# Patient Record
Sex: FEMALE | Race: BLACK OR AFRICAN AMERICAN | NOT HISPANIC OR LATINO | Employment: FULL TIME | URBAN - METROPOLITAN AREA
[De-identification: names, ages, dates, MRNs, and addresses within clinical notes are randomized per-mention and may not be internally consistent; named-entity substitution may affect disease eponyms.]

---

## 2018-02-15 ENCOUNTER — HOSPITAL ENCOUNTER (EMERGENCY)
Facility: MEDICAL CENTER | Age: 58
End: 2018-02-15
Attending: EMERGENCY MEDICINE
Payer: COMMERCIAL

## 2018-02-15 ENCOUNTER — APPOINTMENT (OUTPATIENT)
Dept: RADIOLOGY | Facility: MEDICAL CENTER | Age: 58
End: 2018-02-15
Attending: EMERGENCY MEDICINE
Payer: COMMERCIAL

## 2018-02-15 VITALS
RESPIRATION RATE: 16 BRPM | BODY MASS INDEX: 40.16 KG/M2 | HEART RATE: 62 BPM | HEIGHT: 69 IN | WEIGHT: 271.17 LBS | TEMPERATURE: 97 F | SYSTOLIC BLOOD PRESSURE: 160 MMHG | OXYGEN SATURATION: 98 % | DIASTOLIC BLOOD PRESSURE: 93 MMHG

## 2018-02-15 DIAGNOSIS — S40.012A CONTUSION OF LEFT SHOULDER, INITIAL ENCOUNTER: ICD-10-CM

## 2018-02-15 PROCEDURE — 99284 EMERGENCY DEPT VISIT MOD MDM: CPT

## 2018-02-15 PROCEDURE — 73030 X-RAY EXAM OF SHOULDER: CPT | Mod: LT

## 2018-02-15 RX ORDER — METHYLPREDNISOLONE 4 MG/1
TABLET ORAL
Qty: 21 TAB | Refills: 0 | Status: SHIPPED | OUTPATIENT
Start: 2018-02-15 | End: 2022-01-09

## 2018-02-15 ASSESSMENT — PAIN SCALES - GENERAL: PAINLEVEL_OUTOF10: 4

## 2018-02-15 NOTE — LETTER
"  FORM C-4:  EMPLOYEE’S CLAIM FOR COMPENSATION/ REPORT OF INITIAL TREATMENT  EMPLOYEE’S CLAIM - PROVIDE ALL INFORMATION REQUESTED   First Name  Olga Last Name  Adenike Birthdate             Age  1960 57 y.o. Sex  female Claim Number   Home Employee Address  [address]   [facility]                                     Zip  [address] Height  1.753 m (5' 9\") Weight  123 kg (271 lb 2.7 oz) N  [id number]   Mailing Employee Address                           [address]    [facility]               Zip  [address] Telephone  [phone] (home)  Primary Language Spoken  ENGLISH   Insurer  [employer]   [employer] Employee's Occupation (Job Title) When Injury or Occupational Disease Occurred     Employer's Name  [employer]   Telephone  [phone]    Employer Address  [address]] Zip  [address]   Date of Injury  2/2/2018       Hour of Injury  1:00 AM Date Employer Notified  2/2/2018 Last Day of Work after Injury or Occupational Disease  2/15/2018 Supervisor to Whom Injury Reported  Edwin Churchill   Address or Location of Accident (if applicable)  [[place]]   What were you doing at the time of accident? (if applicable)  Entering elevator on electric pallet jacka and elevator came down on me    How did this injury or occupational disease occur? Be specific and answer in detail. Use additional sheet if necessary)  I was hit on the head and shoulder by elevator door while entering elevator   If you believe that you have an occupational disease, when did you first have knowledge of the disability and it relationship to your employment?  N/A Witnesses to the Accident  Julian Minor     Nature of Injury or Occupational Disease  Contusion  Part(s) of Body Injured or Affected  Skull, Shoulder (L), N/A    I certify that the above is true and correct to the best of my knowledge and that I have provided this " information in order to obtain the benefits of [place]  I hereby authorize any physician, chiropractor, surgeon, practitioner, or other person, any hospital, including [facility] or [facility], any medical service organization, any insurance company, or other institution or organization to release to each other, any medical or other information, including benefits paid or payable, pertinent to this injury or disease, except information relative to diagnosis, treatment and/or counseling for AIDS, psychological conditions, alcohol or controlled substances, for which I must give specific authorization.  A Photostat of this authorization shall be as valid as the original.   Date  02/15/2018 [facility] Employee’s Signature   THIS REPORT MUST BE COMPLETED AND MAILED WITHIN 3 WORKING DAYS OF TREATMENT   Place  [facility], EMERGENCY DEPT  Name of Facility   [facility]   Date  2/15/2018 Diagnosis  (S40.012A) Contusion of left shoulder, initial encounter Is there evidence the injured employee was under the influence of alcohol and/or another controlled substance at the time of accident?   Hour  10:28 AM Description of Injury or Disease  Contusion of left shoulder, initial encounter No   Treatment  Exam, x-ray, medication  Have you advised the patient to remain off work five days or more?         No   X-Ray Findings  Negative   If Yes   From Date    To Date      From information given by the employee, together with medical evidence, can you directly connect this injury or occupational disease as job incurred?  Yes If No, is the employee capable of: Full Duty  No Modified Duty  Yes   Is additional medical care by a physician indicated?  Yes If Modified Duty, Specify any Limitations / Restrictions  No using left arm     Do you know of  "any previous injury or disease contributing to this condition or occupational disease?  No   Date  2/15/2018 Print Doctor’s Name  Corona Moeller YASMIN certify the employer’s copy of this form was mailed on:   Address  [address] [address] [phone] Insurer’s Use Only   [facility] [facility]  [address]    Provider’s Tax ID Number  [id number] Telephone  Dept: [phone]    Doctor’s Signature  e-TIFFANIE Cote M.D. Degree   MD    Original - TREATING PHYSICIAN OR CHIROPRACTOR   Pg 2-Insurer/TPA   Pg 3-Employer   Pg 4-Employee                                                                                                  Form C-4 (rev01/03)     BRIEF DESCRIPTION OF RIGHTS AND BENEFITS  (Pursuant to [place] 616C.050)    Notice of Injury or Occupational Disease (Incident Report Form C-1): If an injury or occupational disease (OD) arises out of and in the course of employment, you must provide written notice to your employer as soon as practicable, but no later than 7 days after the accident or OD. Your employer shall maintain a sufficient supply of the required forms.    Claim for Compensation (Form C-4): If medical treatment is sought, the form C-4 is available at the place of initial treatment. A completed \"Claim for Compensation\" (Form C-4) must be filed within 90 days after an accident or OD. The treating physician or chiropractor must, within 3 working days after treatment, complete and mail to the employer, the employer's insurer and third-party , the Claim for Compensation.    Medical Treatment: If you require medical treatment for your on-the-job injury or OD, you may be required to select a physician or chiropractor from a list provided by your workers’ compensation insurer, if it has contracted with an Organization for Managed Care (MCO) or Preferred Provider Organization (PPO) or providers of health care. If your employer has not entered into a contract with an " MCO or PPO, you may select a physician or chiropractor from the Panel of Physicians and Chiropractors. Any medical costs related to your industrial injury or OD will be paid by your insurer.    Temporary Total Disability (TTD): If your doctor has certified that you are unable to work for a period of at least 5 consecutive days, or 5 cumulative days in a 20-day period, or places restrictions on you that your employer does not accommodate, you may be entitled to TTD compensation.    Temporary Partial Disability (TPD): If the wage you receive upon reemployment is less than the compensation for TTD to which you are entitled, the insurer may be required to pay you TPD compensation to make up the difference. TPD can only be paid for a maximum of 24 months.    Permanent Partial Disability (PPD): When your medical condition is stable and there is an indication of a PPD as a result of your injury or OD, within 30 days, your insurer must arrange for an evaluation by a rating physician or chiropractor to determine the degree of your PPD. The amount of your PPD award depends on the date of injury, the results of the PPD evaluation and your age and wage.    Permanent Total Disability (PTD): If you are medically certified by a treating physician or chiropractor as permanently and totally disabled and have been granted a PTD status by your insurer, you are entitled to receive monthly benefits not to exceed 66 2/3% of your average monthly wage. The amount of your PTD payments is subject to reduction if you previously received a PPD award.    Vocational Rehabilitation Services: You may be eligible for vocational rehabilitation services if you are unable to return to the job due to a permanent physical impairment or permanent restrictions as a result of your injury or occupational disease.    Transportation and Per Melany Reimbursement: You may be eligible for travel expenses and per melany associated with medical treatment.  Reopening:  You may be able to reopen your claim if your condition worsens after claim closure.    Appeal Process: If you disagree with a written determination issued by the insurer or the insurer does not respond to your request, you may appeal to the Department of Administration, , by following the instructions contained in your determination letter. You must appeal the determination within 70 days from the date of the determination letter at [address], [address], [place], [place] [address], or [address], [address], [place], [place] [address]. If you disagree with the  decision, you may appeal to the Department of Administration, . You must file your appeal within 30 days from the date of the  decision letter at [address], [address], [place], [place] [address], or [address], [place], [place] [address]. If you disagree with a decision of an , you may file a petition for judicial review with the District Court. You must do so within 30 days of the Appeal Officer’s decision. You may be represented by an  at your own expense or you may contact the [facility] for possible representation.    [place]  for Injured Workers (NAIW): If you disagree with a  decision, you may request that NAIW represent you without charge at an  Hearing. For information regarding denial of benefits, you may contact the [facility] at: [address], [address], [place] [address], [phone], or [address], [address], [place] [address], [phone]    To File a Complaint with the Division: If you wish to file a complaint with the  of the Division of Industrial Relations (DIR), please contact the Workers’ Compensation Section, [address], [address], [place], [place] [address], telephone (520) [phone], or [address], [address],  [place], [place] [address], telephone [phone].    For assistance with Workers’ Compensation Issues: you may contact the Office of the Governor Consumer Health Assistance, [address] [place] [address], [address], [place], [place] [address], Toll Free 1-387.213.5890, Web site: [email or web address] access [email or web address]., E-mail [email or web address].                                                                                                                                                                               __________________________________________________________________                                    _________________            Employee Name / Signature                                                                                                                            Date                                       D-2 (rev. 10/07)

## 2018-02-15 NOTE — ED PROVIDER NOTES
ED Provider Note    Scribed for Ronnell Go M.D. by Simon Henderson. 2/15/2018  8:39 AM    Primary care provider: Abel Burnette M.D.  Means of arrival: walk in  History obtained from: patient  History limited by: none    CHIEF COMPLAINT  Chief Complaint   Patient presents with   • Head Injury   • Shoulder Injury       HPI  Olga Jeong is a 57 y.o. female who presents to the Emergency Department complaining of intermittent headache and left shoulder pain status post blunt injury sustained 13 days ago. Patient reports that she was at work on February 2nd on a pallet , when she was impacted from above on the top of her head and left shoulder by a freight elevator door. She states that her pain is intermittent but has persisted for 13 days, prompting her to come to the ED today. Patient denies loss of consciousness, dizziness.     REVIEW OF SYSTEMS  Pertinent positives include headache, shoulder pain.   Pertinent negatives include no loss of consciousness, dizziness.    All other systems reviewed and negative.    E.    PAST MEDICAL HISTORY   has a past medical history of HTN (hypertension) and Indigestion.    SURGICAL HISTORY   has a past surgical history that includes cholecystectomy and ercp in or (N/A, 10/10/2015).    SOCIAL HISTORY  Social History   Substance Use Topics   • Smoking status: Never Smoker   • Smokeless tobacco: None noted   • Alcohol use No      History   Drug Use No       FAMILY HISTORY  Family History   Problem Relation Age of Onset   • Stroke Mother        CURRENT MEDICATIONS  Home Medications     Reviewed by Suzanne Victoria R.N. (Registered Nurse) on 02/15/18 at 0721  Med List Status: Partial   Medication Last Dose Status   AMOXICILLIN PO  Active   aspirin (ASA) 81 MG Chew Tab chewable tablet 2/14/2018 Active   hydrochlorothiazide (HYDRODIURIL) 25 MG TABS 2/14/2018 Active   hydrocodone-acetaminophen (NORCO) 5-325 MG Tab per tablet 2/14/2018 Active   lisinopril (PRINIVIL)  "20 MG TABS 2/14/2018 Active   pravastatin (PRAVACHOL) 20 MG TABS 2/14/2018 Active                ALLERGIES  No Known Allergies    PHYSICAL EXAM  VITAL SIGNS: /87   Pulse 69   Temp 36.1 °C (97 °F) (Temporal)   Resp 16   Ht 1.753 m (5' 9\")   Wt 123 kg (271 lb 2.7 oz)   SpO2 97%   BMI 40.04 kg/m²     Nursing note and vitals reviewed.  Constitutional: Well-developed and well-nourished. No distress.   HENT: Head is normocephalic and atraumatic. Oropharynx is clear and moist without exudate or erythema.   Eyes: Pupils are equal, round, and reactive to light. Conjunctiva are normal.   Musculoskeletal: Extremities exhibit normal range of motion without edema. Mild tenderness over left dicromian. No tenderness of the clavicle and no clavicle deformity.   Neurological: Awake, alert and oriented to person, place, and time. No focal deficits noted.  Skin: Skin is warm and dry. No rash.   Psychiatric: Normal mood and affect. Appropriate for clinical situation    DIAGNOSTIC STUDIES / PROCEDURES    RADIOLOGY  DX-SHOULDER 2+ LEFT   Final Result      No evidence of acute fracture or dislocation.      The radiologist's interpretation of all radiological studies have been reviewed by me.    COURSE & MEDICAL DECISION MAKING  Nursing notes, VS, PMSFHx reviewed in chart.     8:39 AM - Patient seen and examined at bedside. At this time I do not suspect fracture. Patient's greatest pain ins in her shoulder, but she is able to range her arm fully with some tenderness. She will be evaluted to rule out occult fractures. I discussed treatment with antiinflammatory and follow up instructions if her reults were negative. Patient verbalizes understanding and agrees to this course of action. Ordered DX shoulder to evaluate her symptoms. The differential diagnoses include but are not limited to: bursitis, shoulder fracture    10:13 AM - Recheck: Patient re-evaluated at beside. Patient's radiology results discussed. Discussed patient's " condition and treatment plan. Patient will be discharged with instructions and provided with strict return precautions. Patient will be sent home with a prescription for Medrol 4 mg. Advised to follow up with occupational health. Instructed to return to Emergency Department immediately if any new or worsening symptoms.    Patient presents today with left shoulder pain. She has a prior injury. She had some mild head trauma and no loss consciousness. She does not meet criteria for head CT. X-ray of the left shoulder is unrevealing. The patient will follow-up with Workmen's Compensation and understands that she may need an MRI in the future to evaluate for soft tissue injury.    The patient will return for new or worsening symptoms and is stable at the time of discharge.    The patient is referred to a primary physician for blood pressure management, diabetic screening, and for all other preventative health concerns.    DISPOSITION:  Patient will be discharged home in stable condition.    FOLLOW UP:  [facility], Emergency Dept  [address] [id number] [phone]    If symptoms worsen    [facility]  [address] [place] [address]  [phone]            OUTPATIENT MEDICATIONS:  New Prescriptions    METHYLPREDNISOLONE (MEDROL) 4 MG TAB    Take as per the package instructions.         FINAL IMPRESSION  1. Contusion of left shoulder, initial encounter          Simon HOFFMAN (Monroeibe), am scribing for, and in the presence of, Ronnell Go M.D..    Electronically signed by: Simon Henderson (Dil[place]), 2/15/2018    Ronnell HOFFMAN M.D. personally performed the services described in this documentation, as scribed by Simon Henderson in my presence, and it is both accurate and complete.    The note accurately reflects work and decisions made by me.  Ronnell Go  2/15/2018  2:50 PM

## 2018-02-15 NOTE — ED NOTES
Pt ambulated to yellow 63,provided with gown to wear.  Per pt pain comes and goes but not worst today.

## 2018-02-15 NOTE — ED TRIAGE NOTES
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

## 2018-02-15 NOTE — DISCHARGE INSTRUCTIONS
Shoulder Pain  The shoulder is the joint that connects your arms to your body. The bones that form the shoulder joint include the upper arm bone (humerus), the shoulder blade (scapula), and the collarbone (clavicle). The top of the humerus is shaped like a ball and fits into a rather flat socket on the scapula (glenoid cavity). A combination of muscles and strong, fibrous tissues that connect muscles to bones (tendons) support your shoulder joint and hold the ball in the socket. Small, fluid-filled sacs (bursae) are located in different areas of the joint. They act as cushions between the bones and the overlying soft tissues and help reduce friction between the gliding tendons and the bone as you move your arm. Your shoulder joint allows a wide range of motion in your arm. This range of motion allows you to do things like scratch your back or throw a ball. However, this range of motion also makes your shoulder more prone to pain from overuse and injury.  Causes of shoulder pain can originate from both injury and overuse and usually can be grouped in the following four categories:  · Redness, swelling, and pain (inflammation) of the tendon (tendinitis) or the bursae (bursitis).  · Instability, such as a dislocation of the joint.  · Inflammation of the joint (arthritis).  · Broken bone (fracture).  HOME CARE INSTRUCTIONS   · Apply ice to the sore area.  ¨ Put ice in a plastic bag.  ¨ Place a towel between your skin and the bag.  ¨ Leave the ice on for 15-20 minutes, 3-4 times per day for the first 2 days, or as directed by your health care provider.  · Stop using cold packs if they do not help with the pain.  · If you have a shoulder sling or immobilizer, wear it as long as your caregiver instructs. Only remove it to shower or bathe. Move your arm as little as possible, but keep your hand moving to prevent swelling.  · Squeeze a soft ball or foam pad as much as possible to help prevent swelling.  · Only take  over-the-counter or prescription medicines for pain, discomfort, or fever as directed by your caregiver.  SEEK MEDICAL CARE IF:   · Your shoulder pain increases, or new pain develops in your arm, hand, or fingers.  · Your hand or fingers become cold and numb.  · Your pain is not relieved with medicines.  SEEK IMMEDIATE MEDICAL CARE IF:   · Your arm, hand, or fingers are numb or tingling.  · Your arm, hand, or fingers are significantly swollen or turn white or blue.  MAKE SURE YOU:   · Understand these instructions.  · Will watch your condition.  · Will get help right away if you are not doing well or get worse.     This information is not intended to replace advice given to you by your health care provider. Make sure you discuss any questions you have with your health care provider.     Document Released: 09/27/2006 Document Revised: 01/08/2016 Document Reviewed: 04/11/2016  [employer] Interactive Patient Education ©2016 [employer]

## 2019-06-18 ENCOUNTER — HOSPITAL ENCOUNTER (OUTPATIENT)
Dept: LAB | Facility: MEDICAL CENTER | Age: 59
End: 2019-06-18
Attending: SURGERY
Payer: COMMERCIAL

## 2019-06-18 PROCEDURE — 88304 TISSUE EXAM BY PATHOLOGIST: CPT

## 2019-06-19 LAB — PATHOLOGY CONSULT NOTE: NORMAL

## 2020-04-22 ENCOUNTER — HOSPITAL ENCOUNTER (OUTPATIENT)
Dept: CARDIOLOGY | Facility: MEDICAL CENTER | Age: 60
End: 2020-04-22
Attending: NURSE PRACTITIONER
Payer: COMMERCIAL

## 2020-04-22 DIAGNOSIS — R60.9 EDEMA, UNSPECIFIED TYPE: ICD-10-CM

## 2020-04-22 LAB
LV EJECT FRACT  99904: 65
LV EJECT FRACT MOD 2C 99903: 65.37
LV EJECT FRACT MOD 4C 99902: 68.08
LV EJECT FRACT MOD BP 99901: 66.18

## 2020-04-22 PROCEDURE — 93306 TTE W/DOPPLER COMPLETE: CPT

## 2020-04-22 PROCEDURE — 93306 TTE W/DOPPLER COMPLETE: CPT | Mod: 26 | Performed by: INTERNAL MEDICINE

## 2021-03-15 DIAGNOSIS — Z23 NEED FOR VACCINATION: ICD-10-CM

## 2021-05-08 ENCOUNTER — APPOINTMENT (OUTPATIENT)
Dept: RADIOLOGY | Facility: MEDICAL CENTER | Age: 61
End: 2021-05-08
Attending: EMERGENCY MEDICINE
Payer: COMMERCIAL

## 2021-05-08 ENCOUNTER — HOSPITAL ENCOUNTER (EMERGENCY)
Facility: MEDICAL CENTER | Age: 61
End: 2021-05-08
Attending: EMERGENCY MEDICINE
Payer: COMMERCIAL

## 2021-05-08 VITALS
HEIGHT: 69 IN | RESPIRATION RATE: 15 BRPM | SYSTOLIC BLOOD PRESSURE: 144 MMHG | DIASTOLIC BLOOD PRESSURE: 76 MMHG | TEMPERATURE: 98.2 F | WEIGHT: 277.78 LBS | OXYGEN SATURATION: 96 % | HEART RATE: 52 BPM | BODY MASS INDEX: 41.14 KG/M2

## 2021-05-08 DIAGNOSIS — I10 ESSENTIAL HYPERTENSION: ICD-10-CM

## 2021-05-08 DIAGNOSIS — R11.0 NAUSEA: ICD-10-CM

## 2021-05-08 DIAGNOSIS — R42 VERTIGO: ICD-10-CM

## 2021-05-08 LAB
ALBUMIN SERPL BCP-MCNC: 3.9 G/DL (ref 3.2–4.9)
ALBUMIN/GLOB SERPL: 1.3 G/DL
ALP SERPL-CCNC: 87 U/L (ref 30–99)
ALT SERPL-CCNC: 16 U/L (ref 2–50)
ANION GAP SERPL CALC-SCNC: 9 MMOL/L (ref 7–16)
AST SERPL-CCNC: 27 U/L (ref 12–45)
BASOPHILS # BLD AUTO: 1.3 % (ref 0–1.8)
BASOPHILS # BLD: 0.04 K/UL (ref 0–0.12)
BILIRUB SERPL-MCNC: 0.4 MG/DL (ref 0.1–1.5)
BUN SERPL-MCNC: 14 MG/DL (ref 8–22)
CALCIUM SERPL-MCNC: 9.2 MG/DL (ref 8.5–10.5)
CHLORIDE SERPL-SCNC: 108 MMOL/L (ref 96–112)
CO2 SERPL-SCNC: 22 MMOL/L (ref 20–33)
CREAT SERPL-MCNC: 0.76 MG/DL (ref 0.5–1.4)
EKG IMPRESSION: NORMAL
EOSINOPHIL # BLD AUTO: 0.08 K/UL (ref 0–0.51)
EOSINOPHIL NFR BLD: 2.6 % (ref 0–6.9)
ERYTHROCYTE [DISTWIDTH] IN BLOOD BY AUTOMATED COUNT: 43.8 FL (ref 35.9–50)
GLOBULIN SER CALC-MCNC: 3.1 G/DL (ref 1.9–3.5)
GLUCOSE SERPL-MCNC: 128 MG/DL (ref 65–99)
HCT VFR BLD AUTO: 41.5 % (ref 37–47)
HGB BLD-MCNC: 13.6 G/DL (ref 12–16)
IMM GRANULOCYTES # BLD AUTO: 0.01 K/UL (ref 0–0.11)
IMM GRANULOCYTES NFR BLD AUTO: 0.3 % (ref 0–0.9)
LYMPHOCYTES # BLD AUTO: 1.42 K/UL (ref 1–4.8)
LYMPHOCYTES NFR BLD: 45.8 % (ref 22–41)
MCH RBC QN AUTO: 29.7 PG (ref 27–33)
MCHC RBC AUTO-ENTMCNC: 32.8 G/DL (ref 33.6–35)
MCV RBC AUTO: 90.6 FL (ref 81.4–97.8)
MONOCYTES # BLD AUTO: 0.16 K/UL (ref 0–0.85)
MONOCYTES NFR BLD AUTO: 5.2 % (ref 0–13.4)
NEUTROPHILS # BLD AUTO: 1.39 K/UL (ref 2–7.15)
NEUTROPHILS NFR BLD: 44.8 % (ref 44–72)
NRBC # BLD AUTO: 0 K/UL
NRBC BLD-RTO: 0 /100 WBC
PLATELET # BLD AUTO: 305 K/UL (ref 164–446)
PMV BLD AUTO: 10.7 FL (ref 9–12.9)
POTASSIUM SERPL-SCNC: 4.1 MMOL/L (ref 3.6–5.5)
PROT SERPL-MCNC: 7 G/DL (ref 6–8.2)
RBC # BLD AUTO: 4.58 M/UL (ref 4.2–5.4)
SODIUM SERPL-SCNC: 139 MMOL/L (ref 135–145)
TROPONIN T SERPL-MCNC: 6 NG/L (ref 6–19)
WBC # BLD AUTO: 3.1 K/UL (ref 4.8–10.8)

## 2021-05-08 PROCEDURE — 93005 ELECTROCARDIOGRAM TRACING: CPT

## 2021-05-08 PROCEDURE — 700111 HCHG RX REV CODE 636 W/ 250 OVERRIDE (IP): Performed by: EMERGENCY MEDICINE

## 2021-05-08 PROCEDURE — 84484 ASSAY OF TROPONIN QUANT: CPT

## 2021-05-08 PROCEDURE — 99284 EMERGENCY DEPT VISIT MOD MDM: CPT

## 2021-05-08 PROCEDURE — 80053 COMPREHEN METABOLIC PANEL: CPT

## 2021-05-08 PROCEDURE — 96374 THER/PROPH/DIAG INJ IV PUSH: CPT

## 2021-05-08 PROCEDURE — A9270 NON-COVERED ITEM OR SERVICE: HCPCS | Performed by: EMERGENCY MEDICINE

## 2021-05-08 PROCEDURE — 70450 CT HEAD/BRAIN W/O DYE: CPT

## 2021-05-08 PROCEDURE — 700102 HCHG RX REV CODE 250 W/ 637 OVERRIDE(OP): Performed by: EMERGENCY MEDICINE

## 2021-05-08 PROCEDURE — 93005 ELECTROCARDIOGRAM TRACING: CPT | Performed by: EMERGENCY MEDICINE

## 2021-05-08 PROCEDURE — 85025 COMPLETE CBC W/AUTO DIFF WBC: CPT

## 2021-05-08 RX ORDER — MECLIZINE HYDROCHLORIDE 25 MG/1
25 TABLET ORAL ONCE
Status: COMPLETED | OUTPATIENT
Start: 2021-05-08 | End: 2021-05-08

## 2021-05-08 RX ORDER — ONDANSETRON 4 MG/1
4 TABLET, ORALLY DISINTEGRATING ORAL EVERY 8 HOURS PRN
Qty: 15 TABLET | Refills: 1 | Status: SHIPPED | OUTPATIENT
Start: 2021-05-08 | End: 2022-01-13

## 2021-05-08 RX ORDER — ONDANSETRON 2 MG/ML
4 INJECTION INTRAMUSCULAR; INTRAVENOUS ONCE
Status: COMPLETED | OUTPATIENT
Start: 2021-05-08 | End: 2021-05-08

## 2021-05-08 RX ORDER — ATORVASTATIN CALCIUM 20 MG/1
80 TABLET, FILM COATED ORAL NIGHTLY
Status: SHIPPED | COMMUNITY
End: 2022-01-09

## 2021-05-08 RX ORDER — MECLIZINE HYDROCHLORIDE 25 MG/1
25 TABLET ORAL 3 TIMES DAILY PRN
Qty: 30 TABLET | Refills: 0 | Status: SHIPPED | OUTPATIENT
Start: 2021-05-08 | End: 2022-01-09

## 2021-05-08 RX ORDER — AMLODIPINE BESYLATE AND BENAZEPRIL HYDROCHLORIDE 5; 20 MG/1; MG/1
1 CAPSULE ORAL DAILY
Status: SHIPPED | COMMUNITY
End: 2023-07-18

## 2021-05-08 RX ADMIN — MECLIZINE HYDROCHLORIDE 25 MG: 25 TABLET ORAL at 16:42

## 2021-05-08 RX ADMIN — ONDANSETRON 4 MG: 2 INJECTION INTRAMUSCULAR; INTRAVENOUS at 16:42

## 2021-05-08 NOTE — ED TRIAGE NOTES
.  Chief Complaint   Patient presents with   • Dizziness     nauseated      Pt ambulate to triage with c/o with waking up feeling dizzy and notes the room was spinning. Pt notes nausea, denies vomiting or HA. Pt reports taking all BP medications as directed but BP is higher than normal.    Pt educated on triage process and returned to lobby.

## 2021-05-08 NOTE — Clinical Note
Olga Jeong was seen and treated in our emergency department on 5/8/2021.  She may return to work on 05/12/2021.       If you have any questions or concerns, please don't hesitate to call.      Kevin Monroe D.O.

## 2021-05-08 NOTE — ED NOTES
"Wheeled to room by this RN. Changed into a hospital gown. Call light within reach. Instructed to call staff for any assistance.  Aaox4. C/o dizziness \"room spinning\", happens when her Blood Pressure gets elevated. Has hx of HTN. Pt took blood pressure medication this am.  States sensation in left arm is sharper in right arm.  equal. No drift. No droop/speech is clear. Also reports blurred vision.  "

## 2021-05-09 NOTE — ED PROVIDER NOTES
ED Provider Note    CHIEF COMPLAINT  Chief Complaint   Patient presents with   • Dizziness     nauseated       HPI  Olgajason Jeong is a 60 y.o. female who presents to the emergency room today with complaints of dizziness.  She describes the dizziness as room spinning started this morning and she noticed her blood pressure was elevated she states he does have a history of this in the past.  The dizziness is worse with position changes of her hand or standing.  No loss of speech no loss of movement arms or legs but she does have some generalized weakness and nausea no vomiting.  She has had history of TIAs in the past but this does not feel similar.  Denies chest pain or shortness of breath.  NIH score of 0 .    REVIEW OF SYSTEMS  See HPI for further details. All other systems are negative.     PAST MEDICAL HISTORY  Past Medical History:   Diagnosis Date   • HTN (hypertension)    • Indigestion        FAMILY HISTORY  [unfilled]    SOCIAL HISTORY  Social History     Socioeconomic History   • Marital status:      Spouse name: Not on file   • Number of children: Not on file   • Years of education: Not on file   • Highest education level: Not on file   Occupational History   • Not on file   Tobacco Use   • Smoking status: Never Smoker   Substance and Sexual Activity   • Alcohol use: No   • Drug use: No   • Sexual activity: Not on file   Other Topics Concern   • Not on file   Social History Narrative   • Not on file     Social Determinants of Health     Financial Resource Strain:    • Difficulty of Paying Living Expenses:    Food Insecurity:    • Worried About Running Out of Food in the Last Year:    • Ran Out of Food in the Last Year:    Transportation Needs:    • Lack of Transportation (Medical):    • Lack of Transportation (Non-Medical):    Physical Activity:    • Days of Exercise per Week:    • Minutes of Exercise per Session:    Stress:    • Feeling of Stress :    Social Connections:    • Frequency of  "Communication with Friends and Family:    • Frequency of Social Gatherings with Friends and Family:    • Attends Voodoo Services:    • Active Member of Clubs or Organizations:    • Attends Club or Organization Meetings:    • Marital Status:    Intimate Partner Violence:    • Fear of Current or Ex-Partner:    • Emotionally Abused:    • Physically Abused:    • Sexually Abused:        SURGICAL HISTORY  Past Surgical History:   Procedure Laterality Date   • ERCP IN OR N/A 10/10/2015    Procedure: ERCP IN OR;  Surgeon: Jefry Bennett M.D.;  Location: SURGERY [facility];  Service:    • CHOLECYSTECTOMY         CURRENT MEDICATIONS  Home Medications     Reviewed by Jessica Michelle R.N. (Registered Nurse) on 05/08/21 at 1551  Med List Status: Complete   Medication Last Dose Status   amlodipine-benazepril (LOTREL) 5-20 MG per capsule 5/8/2021 Active   AMOXICILLIN PO  Active   aspirin (ASA) 81 MG Chew Tab chewable tablet 5/8/2021 Active   atorvastatin (LIPITOR) 20 MG Tab 5/8/2021 Active   hydrochlorothiazide (HYDRODIURIL) 25 MG TABS 5/8/2021 Active   hydrocodone-acetaminophen (NORCO) 5-325 MG Tab per tablet not taking Active   lisinopril (PRINIVIL) 20 MG TABS  Active   methylPREDNISolone (MEDROL) 4 MG Tab  Active   pravastatin (PRAVACHOL) 20 MG TABS  Active                ALLERGIES  No Known Allergies    PHYSICAL EXAM  VITAL SIGNS: /76   Pulse (!) 56   Temp 36.8 °C (98.2 °F) (Temporal)   Resp 14   Ht 1.753 m (5' 9\")   Wt (!) 126 kg (277 lb 12.5 oz)   SpO2 92%   BMI 41.02 kg/m²       Constitutional: Well developed, Well nourished,  acute distress, Non-toxic appearance.   HENT: Normocephalic, Atraumatic, Bilateral external ears normal, Oropharynx moist, No oral exudates, Nose normal.   Eyes: PERRLA, EOMI, Conjunctiva normal, No discharge.  Horizontal nystagmus consistent with benign positional vertigo and dizziness is worse with position changes  Neck: Normal range of motion, No tenderness, Supple, No " stridor.   Lymphatic: No lymphadenopathy noted.   Cardiovascular: Normal heart rate, Normal rhythm, No murmurs, No rubs, No gallops.   Thorax & Lungs: Normal breath sounds, No respiratory distress, No wheezing, No chest tenderness.   Abdomen: Bowel sounds normal, Soft, No tenderness, No masses, No pulsatile masses.   Skin: Warm, Dry, No erythema, No rash.   Back: No tenderness, No CVA tenderness.     Extremities: Intact distal pulses, No edema, No tenderness, No cyanosis, No clubbing.   Musculoskeletal: Good range of motion in all major joints. No tenderness to palpation or major deformities noted.   Neurologic: Alert & oriented x 3, Normal motor function, Normal sensory function, No focal deficits noted.  No lateralizing signs  Psychiatric: Affect normal, Judgment normal, Mood normal.     EKG  Normal sinus rhythm 60 bpm, no ST elevation or depression, no widening of the QRS complex, good R wave progression, UT intervals are normal.  The twelve-lead EKG there is no previous EKG developed this time for comparison.    RADIOLOGY/PROCEDURES  CT-HEAD W/O   Final Result      No acute intracranial abnormality.            COURSE & MEDICAL DECISION MAKING  Pertinent Labs & Imaging studies reviewed. (See chart for details)  Patient was given meclizine here in the emergency room along with antinausea medication Zofran her symptoms have resolved.  On reexamination she feels much improved.  She is continued on his medications for home.  I do feel that this is benign positional vertigo as her symptoms changed with position changes of her head as well as horizontal nystagmus she also has apparently similar symptoms in the past with her elevated blood pressure blood pressure was elevated at 188/110 but has improved and now normal.  Patient will follow up with primary care physician return if persistent or worsening symptoms note for work was provided.  Discharged in stable, asymptomatic and improved condition as above to  home.    FINAL IMPRESSION  1.  Acute benign positional vertigo  2.  Hypertension  3.  Nausea         Electronically signed by: Kevin Monroe D.O., 5/8/2021 7:16 PM

## 2021-05-09 NOTE — ED NOTES
Discharge instructions given to pt including follow up with pcp or returning if no improvement of symptoms or to return if worse. Prescription x 2 provided to pt. Questions answered by RN. Denies any new complaints. Discharged w/stable vitals and able to ambulate to the lobby by w/steady gait. Neuro intact.

## 2021-05-09 NOTE — ED NOTES
Omkar Kennedy called and updated pt with the plan of care. Obtained consent via telephone to do Lumbar Puncture on pt.

## 2022-01-09 ENCOUNTER — OFFICE VISIT (OUTPATIENT)
Dept: URGENT CARE | Facility: PHYSICIAN GROUP | Age: 62
End: 2022-01-09
Payer: COMMERCIAL

## 2022-01-09 ENCOUNTER — HOSPITAL ENCOUNTER (OUTPATIENT)
Dept: RADIOLOGY | Facility: MEDICAL CENTER | Age: 62
End: 2022-01-09
Attending: EMERGENCY MEDICINE
Payer: COMMERCIAL

## 2022-01-09 VITALS
DIASTOLIC BLOOD PRESSURE: 84 MMHG | HEART RATE: 98 BPM | RESPIRATION RATE: 18 BRPM | BODY MASS INDEX: 37.94 KG/M2 | SYSTOLIC BLOOD PRESSURE: 138 MMHG | TEMPERATURE: 99.7 F | OXYGEN SATURATION: 94 % | WEIGHT: 265 LBS | HEIGHT: 70 IN

## 2022-01-09 DIAGNOSIS — R05.9 COUGH: ICD-10-CM

## 2022-01-09 DIAGNOSIS — U07.1 LOWER RESPIRATORY TRACT INFECTION DUE TO COVID-19 VIRUS: ICD-10-CM

## 2022-01-09 DIAGNOSIS — J22 LOWER RESPIRATORY TRACT INFECTION DUE TO COVID-19 VIRUS: ICD-10-CM

## 2022-01-09 LAB
EXTERNAL QUALITY CONTROL: NORMAL
SARS-COV+SARS-COV-2 AG RESP QL IA.RAPID: POSITIVE

## 2022-01-09 PROCEDURE — 87426 SARSCOV CORONAVIRUS AG IA: CPT | Performed by: EMERGENCY MEDICINE

## 2022-01-09 PROCEDURE — 71046 X-RAY EXAM CHEST 2 VIEWS: CPT

## 2022-01-09 PROCEDURE — 99203 OFFICE O/P NEW LOW 30 MIN: CPT | Mod: CS | Performed by: EMERGENCY MEDICINE

## 2022-01-09 ASSESSMENT — ENCOUNTER SYMPTOMS
VOMITING: 0
HEMOPTYSIS: 0
CHILLS: 1
SHORTNESS OF BREATH: 1
MYALGIAS: 1
COUGH: 1
SORE THROAT: 1
SPUTUM PRODUCTION: 1
WHEEZING: 0
DIARRHEA: 0
HEADACHES: 1
RHINORRHEA: 1
NAUSEA: 0

## 2022-01-09 ASSESSMENT — FIBROSIS 4 INDEX: FIB4 SCORE: 1.33

## 2022-01-09 NOTE — PROGRESS NOTES
"Subjective     Olga Jeong is a 61 y.o. female who presents with Other (Body aches fever bad cough hot and cold shortness of breath x 6 days)            Cough  This is a new problem. Episode onset: 6 days. The problem has been unchanged. The cough is non-productive. Associated symptoms include chest pain, chills, headaches, myalgias, nasal congestion, rhinorrhea, a sore throat and shortness of breath. Pertinent negatives include no ear pain, hemoptysis or wheezing. There is no history of asthma, environmental allergies or pneumonia.   No COVID vaccination, no prior known infections.    Review of Systems   Constitutional: Positive for chills and malaise/fatigue.   HENT: Positive for congestion, rhinorrhea and sore throat. Negative for ear pain and nosebleeds.    Respiratory: Positive for cough, sputum production and shortness of breath. Negative for hemoptysis and wheezing.    Cardiovascular: Positive for chest pain.        Anterior chest pain associated with coughing only   Gastrointestinal: Negative for diarrhea, nausea and vomiting.   Musculoskeletal: Positive for myalgias.   Neurological: Positive for headaches.   Endo/Heme/Allergies: Negative for environmental allergies.              Objective     /84 (BP Location: Left arm, Patient Position: Sitting, BP Cuff Size: Large adult)   Pulse 98   Temp 37.6 °C (99.7 °F) (Temporal)   Resp 18   Ht 1.765 m (5' 9.5\")   Wt 120 kg (265 lb)   SpO2 94%   BMI 38.57 kg/m²      Physical Exam  Constitutional:       General: She is not in acute distress.     Appearance: She is well-developed. She is not toxic-appearing.   HENT:      Right Ear: Tympanic membrane and ear canal normal.      Left Ear: Tympanic membrane and ear canal normal.      Nose: Mucosal edema present. No rhinorrhea.      Mouth/Throat:      Pharynx: No oropharyngeal exudate or posterior oropharyngeal erythema.   Eyes:      Conjunctiva/sclera: Conjunctivae normal.   Neck:      Trachea: Trachea " normal.   Cardiovascular:      Rate and Rhythm: Normal rate and regular rhythm.      Heart sounds: Normal heart sounds.   Pulmonary:      Effort: Pulmonary effort is normal.      Breath sounds: Decreased breath sounds and rhonchi present. No wheezing or rales.   Musculoskeletal:      Cervical back: Neck supple.      Right lower leg: No edema.      Left lower leg: No edema.   Lymphadenopathy:      Cervical: No cervical adenopathy.   Skin:     General: Skin is warm and dry.   Neurological:      Mental Status: She is alert.   Psychiatric:         Behavior: Behavior is cooperative.                         No currently available antiviral therapy.    Assessment & Plan        1. Lower respiratory tract infection due to COVID-19 virus  Recommended supportive care measures, including rest, increasing oral fluid intake and use of over-the-counter medications for relief of symptoms.  ED if any worsening shortness of breath.  2. Cough  - DX-CHEST-2 VIEWS; Interpretation per radiologist:   1.  Minimal lingular opacity may represent atelectasis or pneumonitis.  2.  Stable cardiomegaly.  3.  Atherosclerotic plaque.  4.  Prominent hiatal hernia.  Positive - POCT SARS-COV Antigen DILLON (Symptomatic Only)  Sending CBC procalcitonin D-dimer.

## 2022-01-13 ENCOUNTER — APPOINTMENT (OUTPATIENT)
Dept: RADIOLOGY | Facility: MEDICAL CENTER | Age: 62
End: 2022-01-13
Payer: COMMERCIAL

## 2022-01-13 ENCOUNTER — HOSPITAL ENCOUNTER (EMERGENCY)
Facility: MEDICAL CENTER | Age: 62
End: 2022-01-13
Attending: EMERGENCY MEDICINE
Payer: COMMERCIAL

## 2022-01-13 ENCOUNTER — APPOINTMENT (OUTPATIENT)
Dept: RADIOLOGY | Facility: MEDICAL CENTER | Age: 62
End: 2022-01-13
Attending: EMERGENCY MEDICINE
Payer: COMMERCIAL

## 2022-01-13 VITALS
TEMPERATURE: 97.7 F | RESPIRATION RATE: 15 BRPM | BODY MASS INDEX: 39.48 KG/M2 | WEIGHT: 266.54 LBS | HEART RATE: 92 BPM | DIASTOLIC BLOOD PRESSURE: 80 MMHG | SYSTOLIC BLOOD PRESSURE: 127 MMHG | OXYGEN SATURATION: 94 % | HEIGHT: 69 IN

## 2022-01-13 DIAGNOSIS — U07.1 COVID-19: ICD-10-CM

## 2022-01-13 LAB
ALBUMIN SERPL BCP-MCNC: 4.1 G/DL (ref 3.2–4.9)
ALBUMIN/GLOB SERPL: 1.2 G/DL
ALP SERPL-CCNC: 83 U/L (ref 30–99)
ALT SERPL-CCNC: 48 U/L (ref 2–50)
ANION GAP SERPL CALC-SCNC: 14 MMOL/L (ref 7–16)
ANISOCYTOSIS BLD QL SMEAR: ABNORMAL
AST SERPL-CCNC: 55 U/L (ref 12–45)
BASOPHILS # BLD AUTO: 0 % (ref 0–1.8)
BASOPHILS # BLD: 0 K/UL (ref 0–0.12)
BILIRUB SERPL-MCNC: 0.6 MG/DL (ref 0.1–1.5)
BUN SERPL-MCNC: 12 MG/DL (ref 8–22)
CALCIUM SERPL-MCNC: 8.7 MG/DL (ref 8.5–10.5)
CHLORIDE SERPL-SCNC: 101 MMOL/L (ref 96–112)
CO2 SERPL-SCNC: 23 MMOL/L (ref 20–33)
CREAT SERPL-MCNC: 0.89 MG/DL (ref 0.5–1.4)
EKG IMPRESSION: NORMAL
EOSINOPHIL # BLD AUTO: 0 K/UL (ref 0–0.51)
EOSINOPHIL NFR BLD: 0 % (ref 0–6.9)
ERYTHROCYTE [DISTWIDTH] IN BLOOD BY AUTOMATED COUNT: 39.2 FL (ref 35.9–50)
GLOBULIN SER CALC-MCNC: 3.3 G/DL (ref 1.9–3.5)
GLUCOSE SERPL-MCNC: 114 MG/DL (ref 65–99)
HCT VFR BLD AUTO: 43.4 % (ref 37–47)
HGB BLD-MCNC: 15.1 G/DL (ref 12–16)
LYMPHOCYTES # BLD AUTO: 1 K/UL (ref 1–4.8)
LYMPHOCYTES NFR BLD: 23.7 % (ref 22–41)
MANUAL DIFF BLD: NORMAL
MCH RBC QN AUTO: 29 PG (ref 27–33)
MCHC RBC AUTO-ENTMCNC: 34.8 G/DL (ref 33.6–35)
MCV RBC AUTO: 83.3 FL (ref 81.4–97.8)
MICROCYTES BLD QL SMEAR: ABNORMAL
MONOCYTES # BLD AUTO: 0.08 K/UL (ref 0–0.85)
MONOCYTES NFR BLD AUTO: 1.8 % (ref 0–13.4)
MORPHOLOGY BLD-IMP: NORMAL
NEUTROPHILS # BLD AUTO: 3.13 K/UL (ref 2–7.15)
NEUTROPHILS NFR BLD: 71 % (ref 44–72)
NEUTS BAND NFR BLD MANUAL: 3.5 % (ref 0–10)
NRBC # BLD AUTO: 0 K/UL
NRBC BLD-RTO: 0 /100 WBC
PLATELET # BLD AUTO: 255 K/UL (ref 164–446)
PLATELET BLD QL SMEAR: NORMAL
PMV BLD AUTO: 10.5 FL (ref 9–12.9)
POTASSIUM SERPL-SCNC: 3.1 MMOL/L (ref 3.6–5.5)
PROT SERPL-MCNC: 7.4 G/DL (ref 6–8.2)
RBC # BLD AUTO: 5.21 M/UL (ref 4.2–5.4)
RBC BLD AUTO: PRESENT
SODIUM SERPL-SCNC: 138 MMOL/L (ref 135–145)
TROPONIN T SERPL-MCNC: 9 NG/L (ref 6–19)
TROPONIN T SERPL-MCNC: 9 NG/L (ref 6–19)
WBC # BLD AUTO: 4.2 K/UL (ref 4.8–10.8)

## 2022-01-13 PROCEDURE — 71045 X-RAY EXAM CHEST 1 VIEW: CPT

## 2022-01-13 PROCEDURE — 93005 ELECTROCARDIOGRAM TRACING: CPT

## 2022-01-13 PROCEDURE — 84484 ASSAY OF TROPONIN QUANT: CPT

## 2022-01-13 PROCEDURE — 96374 THER/PROPH/DIAG INJ IV PUSH: CPT

## 2022-01-13 PROCEDURE — 700111 HCHG RX REV CODE 636 W/ 250 OVERRIDE (IP): Performed by: EMERGENCY MEDICINE

## 2022-01-13 PROCEDURE — 71275 CT ANGIOGRAPHY CHEST: CPT

## 2022-01-13 PROCEDURE — 99284 EMERGENCY DEPT VISIT MOD MDM: CPT

## 2022-01-13 PROCEDURE — 700117 HCHG RX CONTRAST REV CODE 255: Performed by: EMERGENCY MEDICINE

## 2022-01-13 PROCEDURE — 36415 COLL VENOUS BLD VENIPUNCTURE: CPT

## 2022-01-13 PROCEDURE — 93005 ELECTROCARDIOGRAM TRACING: CPT | Performed by: EMERGENCY MEDICINE

## 2022-01-13 PROCEDURE — 96372 THER/PROPH/DIAG INJ SC/IM: CPT

## 2022-01-13 PROCEDURE — 85007 BL SMEAR W/DIFF WBC COUNT: CPT

## 2022-01-13 PROCEDURE — 85027 COMPLETE CBC AUTOMATED: CPT

## 2022-01-13 PROCEDURE — 700105 HCHG RX REV CODE 258: Performed by: EMERGENCY MEDICINE

## 2022-01-13 PROCEDURE — 80053 COMPREHEN METABOLIC PANEL: CPT

## 2022-01-13 RX ORDER — ONDANSETRON 4 MG/1
4 TABLET, ORALLY DISINTEGRATING ORAL EVERY 6 HOURS PRN
Qty: 10 TABLET | Refills: 0 | Status: SHIPPED | OUTPATIENT
Start: 2022-01-13 | End: 2022-01-16

## 2022-01-13 RX ORDER — SODIUM CHLORIDE 9 MG/ML
1000 INJECTION, SOLUTION INTRAVENOUS ONCE
Status: COMPLETED | OUTPATIENT
Start: 2022-01-13 | End: 2022-01-13

## 2022-01-13 RX ORDER — KETOROLAC TROMETHAMINE 30 MG/ML
15 INJECTION, SOLUTION INTRAMUSCULAR; INTRAVENOUS ONCE
Status: COMPLETED | OUTPATIENT
Start: 2022-01-13 | End: 2022-01-13

## 2022-01-13 RX ORDER — ONDANSETRON 2 MG/ML
4 INJECTION INTRAMUSCULAR; INTRAVENOUS ONCE
Status: COMPLETED | OUTPATIENT
Start: 2022-01-13 | End: 2022-01-13

## 2022-01-13 RX ADMIN — SODIUM CHLORIDE 1000 ML: 9 INJECTION, SOLUTION INTRAVENOUS at 15:57

## 2022-01-13 RX ADMIN — IOHEXOL 60 ML: 350 INJECTION, SOLUTION INTRAVENOUS at 18:48

## 2022-01-13 RX ADMIN — ONDANSETRON 4 MG: 2 INJECTION INTRAMUSCULAR; INTRAVENOUS at 15:57

## 2022-01-13 RX ADMIN — KETOROLAC TROMETHAMINE 15 MG: 30 INJECTION, SOLUTION INTRAMUSCULAR; INTRAVENOUS at 15:57

## 2022-01-13 ASSESSMENT — FIBROSIS 4 INDEX: FIB4 SCORE: 1.33

## 2022-01-13 NOTE — ED TRIAGE NOTES
Pt ambulated to triage with   Chief Complaint   Patient presents with   • Shortness of Breath     positive for covid.    • Chest Pain   • Cough     increasing    • N/V     EKG completed.  Protocol ordered.  Pt Informed regarding triage process and verbalized understanding to inform triage tech or RN for any changes in condition. Placed in [facility].

## 2022-01-13 NOTE — ED PROVIDER NOTES
ED Provider Note    Scribed for Sneha Mendoza M.D. by Lexi Rogel. 1/13/2022, 3:17 PM.    Primary care provider: SRAVANTHI Minaya  Means of arrival: Walk In  History obtained from: Patient  History limited by: None     CHIEF COMPLAINT  Chief Complaint   Patient presents with   • Shortness of Breath     positive for covid.    • Chest Pain   • Cough     increasing    • N/V       HPI  Olga Jeong is a 61 y.o. female who presents to the Emergency Department for evaluation of worsening shortness of breath onset ten days ago. She tested positive for COVID-19 four days ago. She experiences associated cough, chest pain, headache, and vomiting. She denies associated diarrhea. She has no history of asthma, cardiac problems or COPD. She is also not on oxygen. She has no history of blood clots. No alleviating or exacerbating factors were identified.    REVIEW OF SYSTEMS  Pertinent positives include shortness of breath, cough, chest pain, headache, and vomiting. Pertinent negatives include no diarrhea. All other systems reviewed and negative.     PAST MEDICAL HISTORY   has a past medical history of HTN (hypertension) and Indigestion.    SURGICAL HISTORY   has a past surgical history that includes cholecystectomy and ercp in or (N/A, 10/10/2015).    SOCIAL HISTORY  Social History     Tobacco Use   • Smoking status: Never Smoker   • Smokeless tobacco: Never Used   Substance Use Topics   • Alcohol use: No   • Drug use: No      Social History     Substance and Sexual Activity   Drug Use No       FAMILY HISTORY  Family History   Problem Relation Age of Onset   • Stroke Mother        CURRENT MEDICATIONS  Home Medications     Reviewed by Aura Davis R.N. (Registered Nurse) on 01/13/22 at 1207  Med List Status: Partial   Medication Last Dose Status   amlodipine-benazepril (LOTREL) 5-20 MG per capsule  Active   aspirin (ASA) 81 MG Chew Tab chewable tablet  Active   hydrochlorothiazide (HYDRODIURIL) 25 MG TABS   "Active   hydrocodone-acetaminophen (NORCO) 5-325 MG Tab per tablet  Active   Naproxen Sodium (ALEVE PO)  Active   ondansetron (ZOFRAN ODT) 4 MG TABLET DISPERSIBLE  Active                ALLERGIES  No Known Allergies    PHYSICAL EXAM  VITAL SIGNS: /88   Pulse 96   Temp 37.1 °C (98.8 °F) (Temporal)   Resp 20   Ht 1.753 m (5' 9\")   Wt 121 kg (266 lb 8.6 oz)   SpO2 95%   BMI 39.36 kg/m²     Constitutional:  Well developed, mild acute distress, Non-toxic appearance.   HENT: Normocephalic, Atraumatic, Bilateral external ears normal, Nose normal.   Eyes: PERRL, EOMI, Conjunctiva normal.    Neck: Normal range of motion, No tenderness, Supple.     Cardiovascular: Normal heart rate, Normal rhythm.    Thorax & Lungs: Normal breath sounds, No respiratory distress.    Abdomen: Benign abdominal exam, no tenderness, no distention, no guarding, no rebound.    Skin: Warm, Dry, No erythema, No rash.   Back: No tenderness, No CVA tenderness.   Extremities: Intact distal pulses, No edema, No tenderness   Neurologic: Alert & oriented x 3, Normal motor function, Normal sensory function, No focal deficits noted.  Psychiatric: Appropriate                                                     DIAGNOSTIC STUDIES / PROCEDURES\    LABS  Results for orders placed or performed during the hospital encounter of 01/13/22   CBC with Differential   Result Value Ref Range    WBC 4.2 (L) 4.8 - 10.8 K/uL    RBC 5.21 4.20 - 5.40 M/uL    Hemoglobin 15.1 12.0 - 16.0 g/dL    Hematocrit 43.4 37.0 - 47.0 %    MCV 83.3 81.4 - 97.8 fL    MCH 29.0 27.0 - 33.0 pg    MCHC 34.8 33.6 - 35.0 g/dL    RDW 39.2 35.9 - 50.0 fL    Platelet Count 255 164 - 446 K/uL    MPV 10.5 9.0 - 12.9 fL    Neutrophils-Polys 71.00 44.00 - 72.00 %    Lymphocytes 23.70 22.00 - 41.00 %    Monocytes 1.80 0.00 - 13.40 %    Eosinophils 0.00 0.00 - 6.90 %    Basophils 0.00 0.00 - 1.80 %    Nucleated RBC 0.00 /100 WBC    Neutrophils (Absolute) 3.13 2.00 - 7.15 K/uL    Lymphs " (Absolute) 1.00 1.00 - 4.80 K/uL    Monos (Absolute) 0.08 0.00 - 0.85 K/uL    Eos (Absolute) 0.00 0.00 - 0.51 K/uL    Baso (Absolute) 0.00 0.00 - 0.12 K/uL    NRBC (Absolute) 0.00 K/uL    Anisocytosis 1+     Microcytosis 1+    Complete Metabolic Panel (CMP)   Result Value Ref Range    Sodium 138 135 - 145 mmol/L    Potassium 3.1 (L) 3.6 - 5.5 mmol/L    Chloride 101 96 - 112 mmol/L    Co2 23 20 - 33 mmol/L    Anion Gap 14.0 7.0 - 16.0    Glucose 114 (H) 65 - 99 mg/dL    Bun 12 8 - 22 mg/dL    Creatinine 0.89 0.50 - 1.40 mg/dL    Calcium 8.7 8.5 - 10.5 mg/dL    AST(SGOT) 55 (H) 12 - 45 U/L    ALT(SGPT) 48 2 - 50 U/L    Alkaline Phosphatase 83 30 - 99 U/L    Total Bilirubin 0.6 0.1 - 1.5 mg/dL    Albumin 4.1 3.2 - 4.9 g/dL    Total Protein 7.4 6.0 - 8.2 g/dL    Globulin 3.3 1.9 - 3.5 g/dL    A-G Ratio 1.2 g/dL   Troponin   Result Value Ref Range    Troponin T 9 6 - 19 ng/L   ESTIMATED GFR   Result Value Ref Range    GFR If African American >60 >60 mL/min/1.73 m 2    GFR If Non African American >60 >60 mL/min/1.73 m 2   MORPHOLOGY   Result Value Ref Range    RBC Morphology Present    PERIPHERAL SMEAR REVIEW   Result Value Ref Range    Peripheral Smear Review see below    DIFFERENTIAL MANUAL   Result Value Ref Range    Bands-Stabs 3.50 0.00 - 10.00 %    Manual Diff Status PERFORMED    PLATELET ESTIMATE   Result Value Ref Range    Plt Estimation Normal    TROPONIN   Result Value Ref Range    Troponin T 9 6 - 19 ng/L   EKG (NOW)   Result Value Ref Range    Report       [facility] Emergency Dept.    Test Date:  2022  Pt Name:    NGUYEN DOWNING                Department: ER  MRN:        [id number]                      Room:  Gender:     Female                       Technician: [id number]  :        1960                   Requested By:ER TRIAGE PROTOCOL  Order #:    [id number]                    Reading MD:    Measurements  Intervals                                Axis  Rate:       88                            P:          11  WI:         156                          QRS:        16  QRSD:       89                           T:          -14  QT:         354  QTc:        429    Interpretive Statements  Sinus rhythm  Probable left atrial enlargement  Borderline T abnormalities, inferior leads  Compared to ECG 05/08/2021 15:18:43  T-wave abnormality now present        All labs reviewed by me.    EKG  12 lead EKG interpreted by me as noted above.    RADIOLOGY  CT-CTA CHEST PULMONARY ARTERY W/ RECONS   Final Result      1.  There is no CT evidence of acute pulmonary embolism.   2.  There are bilateral patchy groundglass opacities consistent with Covid 19 pneumonia.   3.  There are reactive mildly prominent mediastinal and hilar nodes.   4.  There is a large hiatal hernia.            DX-CHEST-PORTABLE (1 VIEW)   Final Result      1.  New bilateral interstitial opacities consistent with Covid 19 pneumonia.        The radiologist's interpretation of all radiological studies have been reviewed by me.    COURSE & MEDICAL DECISION MAKING  Nursing notes, VS, PMSFHx reviewed in chart.   Patient presents to the emergency department with shortness of breath and chest pain and nausea and vomiting.  Patient has been ill for the past 10 days and recently tested positive for COVID 4 days ago.  She presents today due to feeling dehydrated and short of breath.  Patient is not hypoxic or any acute respiratory distress.  Patient denies any previous history of lung problems or heart problems.  EKG shows no ischemic changes.  Due to her continued chest pain and positive COVID test I am concerned about a possible PE.  Therefore we'll obtain a CT of the chest.  Patient was in triage and triage labs were ordered.  Unfortunately she was in the lobby long enough to have delta troponins drawn.  Fortunately both troponins were negative without any delta change.  Patient's potassium was slightly low at 3.1.  Glucose is 114 without evidence of a gap  acidosis.  Liver AST was slightly elevated at 55, ALT was normal.  Patient has a white count of 4.2 which is consistent with COVID.    3:17 PM Patient seen and examined at bedside. Discussed plan of care with patient and discussed lab results with her. Patient verbalizes understanding and support with the plan of care. The patient presents with shortness of breath and the differential diagnosis includes but is not limited to COVID, rule out pulmonary embolism, rule out dehydration. Ordered for EKG, DX-Chest, Troponin, CMP, CBC w/ Diff, Platelet Estimate, Differential Manual, Morphology, and Estimated GFR to evaluate.     CT scan has returned and shows no evidence of PE.  She does have patchy groundglass opacities consistent with COVID.  She does have some mildly prominent lymph nodes.  I have advised patient to follow-up with her primary care doctor for recheck of her chest.  She is advised to go home and rest.  She understands to continue to quarantine until she is symptom-free.  Due to the symptom onset patient is not a candidate for Paxlovid.  Respiratory distress and return precautions were given.    FINAL IMPRESSION  1. COVID-19          Lexi HOFFMAN (Dilan), am scribing for, and in the presence of, Sneha Mendoza M.D..    Electronically signed by: Lexi Llanos), 1/13/2022    Sneha HOFFMAN M.D. personally performed the services described in this documentation, as scribed by Lexi Rogel in my presence, and it is both accurate and complete. C.    The note accurately reflects work and decisions made by me.  Sneha Mendoza M.D.  1/13/2022  7:27 PM

## 2022-01-14 NOTE — ED NOTES
Patient provided with discharge instructions. PIV removed. Patient verbalized understanding. Patient assisted out of ED with steady gait.

## 2022-01-14 NOTE — DISCHARGE INSTRUCTIONS
Follow-up with your regular doctor for recheck of your CT scan results.  Make sure that you are opacities on CT have cleared and that your lymph nodes have improved.  Drink plenty of fluids.  Take Tylenol ibuprofen for pain.  I hope you feel better soon.

## 2023-07-18 ENCOUNTER — APPOINTMENT (OUTPATIENT)
Dept: RADIOLOGY | Facility: MEDICAL CENTER | Age: 63
End: 2023-07-18
Attending: EMERGENCY MEDICINE
Payer: COMMERCIAL

## 2023-07-18 ENCOUNTER — OFFICE VISIT (OUTPATIENT)
Dept: URGENT CARE | Facility: PHYSICIAN GROUP | Age: 63
End: 2023-07-18
Payer: COMMERCIAL

## 2023-07-18 ENCOUNTER — HOSPITAL ENCOUNTER (EMERGENCY)
Facility: MEDICAL CENTER | Age: 63
End: 2023-07-18
Attending: EMERGENCY MEDICINE
Payer: COMMERCIAL

## 2023-07-18 VITALS
HEIGHT: 69 IN | BODY MASS INDEX: 41.47 KG/M2 | HEART RATE: 90 BPM | WEIGHT: 280 LBS | SYSTOLIC BLOOD PRESSURE: 130 MMHG | OXYGEN SATURATION: 96 % | DIASTOLIC BLOOD PRESSURE: 84 MMHG | RESPIRATION RATE: 16 BRPM | TEMPERATURE: 99.3 F

## 2023-07-18 VITALS
RESPIRATION RATE: 15 BRPM | HEART RATE: 72 BPM | TEMPERATURE: 97.8 F | SYSTOLIC BLOOD PRESSURE: 116 MMHG | DIASTOLIC BLOOD PRESSURE: 69 MMHG | WEIGHT: 283.51 LBS | HEIGHT: 69 IN | BODY MASS INDEX: 41.99 KG/M2 | OXYGEN SATURATION: 95 %

## 2023-07-18 DIAGNOSIS — R94.31 NONSPECIFIC ABNORMAL ELECTROCARDIOGRAM (ECG) (EKG): ICD-10-CM

## 2023-07-18 DIAGNOSIS — R07.9 CHEST PAIN, UNSPECIFIED TYPE: ICD-10-CM

## 2023-07-18 DIAGNOSIS — R07.89 CHEST WALL PAIN: ICD-10-CM

## 2023-07-18 DIAGNOSIS — E87.6 HYPOKALEMIA: ICD-10-CM

## 2023-07-18 DIAGNOSIS — K44.9 PARAESOPHAGEAL HERNIA: ICD-10-CM

## 2023-07-18 LAB
ALBUMIN SERPL BCP-MCNC: 4.4 G/DL (ref 3.2–4.9)
ALBUMIN/GLOB SERPL: 1.2 G/DL
ALP SERPL-CCNC: 103 U/L (ref 30–99)
ALT SERPL-CCNC: 17 U/L (ref 2–50)
ANION GAP SERPL CALC-SCNC: 13 MMOL/L (ref 7–16)
AST SERPL-CCNC: 17 U/L (ref 12–45)
BASOPHILS # BLD AUTO: 0.6 % (ref 0–1.8)
BASOPHILS # BLD: 0.03 K/UL (ref 0–0.12)
BILIRUB SERPL-MCNC: 0.5 MG/DL (ref 0.1–1.5)
BUN SERPL-MCNC: 14 MG/DL (ref 8–22)
CALCIUM ALBUM COR SERPL-MCNC: 9.4 MG/DL (ref 8.5–10.5)
CALCIUM SERPL-MCNC: 9.7 MG/DL (ref 8.4–10.2)
CHLORIDE SERPL-SCNC: 101 MMOL/L (ref 96–112)
CO2 SERPL-SCNC: 26 MMOL/L (ref 20–33)
CREAT SERPL-MCNC: 0.99 MG/DL (ref 0.5–1.4)
D DIMER PPP IA.FEU-MCNC: 0.65 UG/ML (FEU) (ref 0–0.5)
EKG IMPRESSION: NORMAL
EOSINOPHIL # BLD AUTO: 0.09 K/UL (ref 0–0.51)
EOSINOPHIL NFR BLD: 1.7 % (ref 0–6.9)
ERYTHROCYTE [DISTWIDTH] IN BLOOD BY AUTOMATED COUNT: 44.3 FL (ref 35.9–50)
GFR SERPLBLD CREATININE-BSD FMLA CKD-EPI: 64 ML/MIN/1.73 M 2
GLOBULIN SER CALC-MCNC: 3.7 G/DL (ref 1.9–3.5)
GLUCOSE SERPL-MCNC: 107 MG/DL (ref 65–99)
HCT VFR BLD AUTO: 41.4 % (ref 37–47)
HGB BLD-MCNC: 13.8 G/DL (ref 12–16)
IMM GRANULOCYTES # BLD AUTO: 0.01 K/UL (ref 0–0.11)
IMM GRANULOCYTES NFR BLD AUTO: 0.2 % (ref 0–0.9)
LYMPHOCYTES # BLD AUTO: 1.71 K/UL (ref 1–4.8)
LYMPHOCYTES NFR BLD: 32.1 % (ref 22–41)
MCH RBC QN AUTO: 29.4 PG (ref 27–33)
MCHC RBC AUTO-ENTMCNC: 33.3 G/DL (ref 32.2–35.5)
MCV RBC AUTO: 88.3 FL (ref 81.4–97.8)
MONOCYTES # BLD AUTO: 0.41 K/UL (ref 0–0.85)
MONOCYTES NFR BLD AUTO: 7.7 % (ref 0–13.4)
NEUTROPHILS # BLD AUTO: 3.08 K/UL (ref 1.82–7.42)
NEUTROPHILS NFR BLD: 57.7 % (ref 44–72)
NRBC # BLD AUTO: 0 K/UL
NRBC BLD-RTO: 0 /100 WBC (ref 0–0.2)
PLATELET # BLD AUTO: 373 K/UL (ref 164–446)
PMV BLD AUTO: 10.4 FL (ref 9–12.9)
POTASSIUM SERPL-SCNC: 3.1 MMOL/L (ref 3.6–5.5)
PROT SERPL-MCNC: 8.1 G/DL (ref 6–8.2)
RBC # BLD AUTO: 4.69 M/UL (ref 4.2–5.4)
SODIUM SERPL-SCNC: 140 MMOL/L (ref 135–145)
TROPONIN T SERPL-MCNC: 9 NG/L (ref 6–19)
WBC # BLD AUTO: 5.3 K/UL (ref 4.8–10.8)

## 2023-07-18 PROCEDURE — 93000 ELECTROCARDIOGRAM COMPLETE: CPT | Performed by: NURSE PRACTITIONER

## 2023-07-18 PROCEDURE — 85379 FIBRIN DEGRADATION QUANT: CPT

## 2023-07-18 PROCEDURE — 71045 X-RAY EXAM CHEST 1 VIEW: CPT

## 2023-07-18 PROCEDURE — 3075F SYST BP GE 130 - 139MM HG: CPT | Performed by: NURSE PRACTITIONER

## 2023-07-18 PROCEDURE — 85025 COMPLETE CBC W/AUTO DIFF WBC: CPT

## 2023-07-18 PROCEDURE — 93005 ELECTROCARDIOGRAM TRACING: CPT

## 2023-07-18 PROCEDURE — 36415 COLL VENOUS BLD VENIPUNCTURE: CPT

## 2023-07-18 PROCEDURE — 700117 HCHG RX CONTRAST REV CODE 255: Performed by: EMERGENCY MEDICINE

## 2023-07-18 PROCEDURE — 700111 HCHG RX REV CODE 636 W/ 250 OVERRIDE (IP): Performed by: EMERGENCY MEDICINE

## 2023-07-18 PROCEDURE — 3079F DIAST BP 80-89 MM HG: CPT | Performed by: NURSE PRACTITIONER

## 2023-07-18 PROCEDURE — 84484 ASSAY OF TROPONIN QUANT: CPT

## 2023-07-18 PROCEDURE — 99285 EMERGENCY DEPT VISIT HI MDM: CPT

## 2023-07-18 PROCEDURE — 99205 OFFICE O/P NEW HI 60 MIN: CPT | Performed by: NURSE PRACTITIONER

## 2023-07-18 PROCEDURE — 96374 THER/PROPH/DIAG INJ IV PUSH: CPT

## 2023-07-18 PROCEDURE — 80053 COMPREHEN METABOLIC PANEL: CPT

## 2023-07-18 PROCEDURE — 71275 CT ANGIOGRAPHY CHEST: CPT

## 2023-07-18 RX ORDER — KETOROLAC TROMETHAMINE 30 MG/ML
15 INJECTION, SOLUTION INTRAMUSCULAR; INTRAVENOUS ONCE
Status: COMPLETED | OUTPATIENT
Start: 2023-07-18 | End: 2023-07-18

## 2023-07-18 RX ORDER — OXYCODONE HYDROCHLORIDE AND ACETAMINOPHEN 5; 325 MG/1; MG/1
1 TABLET ORAL EVERY 6 HOURS PRN
Qty: 12 TABLET | Refills: 0 | Status: SHIPPED | OUTPATIENT
Start: 2023-07-18 | End: 2023-07-21

## 2023-07-18 RX ORDER — CHLORTHALIDONE 25 MG/1
25 TABLET ORAL DAILY
COMMUNITY

## 2023-07-18 RX ORDER — ATORVASTATIN CALCIUM 80 MG/1
80 TABLET, FILM COATED ORAL DAILY
COMMUNITY

## 2023-07-18 RX ORDER — NAPROXEN SODIUM 220 MG
220 TABLET ORAL 2 TIMES DAILY PRN
Status: ON HOLD | COMMUNITY
End: 2023-12-06

## 2023-07-18 RX ORDER — TELMISARTAN 80 MG/1
80 TABLET ORAL DAILY
COMMUNITY

## 2023-07-18 RX ORDER — ALBUTEROL SULFATE 90 UG/1
AEROSOL, METERED RESPIRATORY (INHALATION)
COMMUNITY
End: 2023-07-18

## 2023-07-18 RX ORDER — PANTOPRAZOLE SODIUM 40 MG/1
40 TABLET, DELAYED RELEASE ORAL DAILY
Qty: 30 TABLET | Refills: 0 | Status: SHIPPED | OUTPATIENT
Start: 2023-07-18 | End: 2023-12-01

## 2023-07-18 RX ORDER — GLUCOSAMINE/D3/BOSWELLIA SERRA 1500MG-400
10000 TABLET ORAL DAILY
COMMUNITY

## 2023-07-18 RX ADMIN — KETOROLAC TROMETHAMINE 15 MG: 30 INJECTION, SOLUTION INTRAMUSCULAR; INTRAVENOUS at 11:11

## 2023-07-18 RX ADMIN — IOHEXOL 57 ML: 350 INJECTION, SOLUTION INTRAVENOUS at 12:05

## 2023-07-18 ASSESSMENT — HEART SCORE
HISTORY: SLIGHTLY SUSPICIOUS
AGE: 45-64
RISK FACTORS: 1-2 RISK FACTORS
ECG: NON-SPECIFIC REPOLARIZATION DISTURBANCE
HEART SCORE: 3
TROPONIN: LESS THAN OR EQUAL TO NORMAL LIMIT

## 2023-07-18 ASSESSMENT — PAIN DESCRIPTION - PAIN TYPE
TYPE: ACUTE PAIN
TYPE: ACUTE PAIN

## 2023-07-18 ASSESSMENT — FIBROSIS 4 INDEX
FIB4 SCORE: 1.93
FIB4 SCORE: 1.93

## 2023-07-18 NOTE — ED NOTES
Pt pain has improved at this time. Pt requesting a warm compress for eye, ER tech to take one to pt. Pt declines any other needs or questions at this time. VS updated and call light in reach.

## 2023-07-18 NOTE — PROGRESS NOTES
Patient has consented to treatment and for use of patient information for treatment and billing purposes.    Date: 07/18/23     Arrival Mode: Private Vehicle    Chief Complaint:    Chief Complaint   Patient presents with    Chest Pain     X 4 days and getting worse, pt states that it hurts to breathe and with certain movements.         History of Present Illness: 63 y.o.  female presents to clinic with 4-day history of left sided chest pain.  Patient states over the past 4 days the pain has worsened.  She states it is worse with activity although does admit that is worse with certain movements as well.  Pain does slightly improve with rest.  She states that it does hurt to breathe.  She denies any chest wall injuries overexertion or new activities.  She states at work she uses her right hand.  She does admit to left shoulder pain that she originally thought was muscular.  She states she did use a massager yesterday left shoulder neck area.  She denies any recent illness, cough fevers or body aches.  She denies any lower leg swelling.  She has been taking her medications as prescribed. Denies pain down left or right arm, neck pain, excessive sweating or feeling light headed. No nausea vomiting heart burn.       ROS:    As stated in HPI     Pertinent Medical History:  Past Medical History:   Diagnosis Date    HTN (hypertension)     Indigestion         Pertinent Surgical History:  Past Surgical History:   Procedure Laterality Date    ERCP IN OR N/A 10/10/2015    Procedure: ERCP IN OR;  Surgeon: Jefry Bennett M.D.;  Location: SURGERY [facility];  Service:     CHOLECYSTECTOMY          Pertinent Medications:    Current Outpatient Medications on File Prior to Visit   Medication Sig Dispense Refill    atorvastatin (LIPITOR) 80 MG tablet TAKE 1 TABLET BY MOUTH EVERYDAY AT BEDTIME for 90      chlorthalidone (HYGROTON) 25 MG Tab 1 tablet in the morning with food *replaces HCTZ* Orally Once a day for 90 days       telmisartan (MICARDIS) 80 MG Tab 1 tablet *replaces losartan* Orally Once a day for 90 days      albuterol 108 (90 Base) MCG/ACT Aero Soln inhalation aerosol 1-2 PUFFS AS NEEDED INHALATION FOR SOB EVERY 4 HRS 30 DAYS for 30      aspirin (ASA) 81 MG Chew Tab chewable tablet Take 81 mg by mouth every day.       No current facility-administered medications on file prior to visit.        Allergies:    Patient has no known allergies.     Social History:  Social History     Tobacco Use    Smoking status: Never    Smokeless tobacco: Never   Substance Use Topics    Alcohol use: No    Drug use: No        No LMP recorded. Patient is postmenopausal.           Physical Exam:    Vitals:    07/18/23 0814   BP: 130/84   Pulse: 90   Resp: 16   Temp: 37.4 °C (99.3 °F)   SpO2: 96%             Physical Exam  Constitutional:       Appearance: Normal appearance.      Comments: Tearful, tripod    HENT:      Head: Normocephalic and atraumatic.   Cardiovascular:      Rate and Rhythm: Normal rate and regular rhythm.      Pulses: Normal pulses.      Heart sounds: Normal heart sounds.      No friction rub.   Pulmonary:      Effort: Pulmonary effort is normal.      Breath sounds: Decreased air movement present. Examination of the right-middle field reveals decreased breath sounds. Examination of the left-middle field reveals decreased breath sounds. Examination of the right-lower field reveals decreased breath sounds. Examination of the left-lower field reveals decreased breath sounds. Decreased breath sounds present.   Chest:       Musculoskeletal:      Right lower leg: No edema.      Left lower leg: No edema.   Neurological:      Mental Status: She is alert.          Diagnostics:    EKG: NSR with flat T waves     Diagnostics interpreted by myself.    Medical Decision making and clinic course :    I personally reviewed prior external notes and test results pertinent to today's visit.  Shared decision-making was utilized with patient for  treatment plan.    Pleasant 63-year-old female presenting clinic with 4-day history of of chest pain.  Discussed differentials with the patient.  Did obtain in clinic EKG normal sinus rhythm.  Flat T waves noted.  Patient has had borderline T wave abnormalities on previous EKGs (2022) .  Discussed flat T, can be a sign of previous ischemia, hypokalemia or baseline.  Patient does take chlorthalidone which is potassium sparing yet  pt does have hx of hypokalemia.  Patient's left chest wall is tender to palpation although pain is out of proportion to exam.  Using shared decision making patient is agreeable to seek higher level care for further evaluation and monitoring.  Patient did decline EMS.  She was seek higher level care via private car.       1. Chest pain, unspecified type    - EKG - Clinic Performed    2. Chest wall pain      3. Nonspecific abnormal electrocardiogram (ECG) (EKG)       All of the patient's questions were answered to their satisfaction at the time of discharge.        Disposition:  Higher level of care via private car.  Transfer center called.    Voice Recognition Disclaimer:  Portions of this document were created using voice recognition software. The software does have a chance of producing errors of grammar and possibly content. I have made every reasonable attempt to correct obvious errors, but there may be errors of grammar and possibly content that I did not discover before finalizing the documentation.    Nia Moreno, [email or web address].

## 2023-07-18 NOTE — ED PROVIDER NOTES
ED Provider Note    CHIEF COMPLAINT  Chief Complaint   Patient presents with    Chest Pain     Started this past Saturday  denies injury   pain getting worse  achy type pain    Shortness of Breath     Started this past Saturday         EXTERNAL RECORDS REVIEWED  Outpatient Notes from urgent care earlier today noted chest pain for 4 days hurting with deep breath had decreased breath sounds and chest wall tenderness noted    HPI/ROS  LIMITATION TO HISTORY   Select: : None  OUTSIDE HISTORIAN(S):  none    Olga Jeong is a 63 y.o. female who presents with chest pain.  Patient reports that on Saturday after cleaning out some drawers she began to notice some left sided chest pain.  She describes it as a burning and pressure, there is no radiation to her back to her arms, to her neck or elsewhere.  There is no ripping or tearing sensation.  Not exertional or pleuritic.  She states it has been constant but it does seem to get worse when she twists with certain movements or lays flat    PAST MEDICAL HISTORY   has a past medical history of HTN (hypertension) and Indigestion.    SURGICAL HISTORY   has a past surgical history that includes cholecystectomy and ercp in or (N/A, 10/10/2015).    FAMILY HISTORY  Family History   Problem Relation Age of Onset    Stroke Mother        SOCIAL HISTORY  Social History     Tobacco Use    Smoking status: Never    Smokeless tobacco: Never   Substance and Sexual Activity    Alcohol use: No    Drug use: No    Sexual activity: Not on file       CURRENT MEDICATIONS  Home Medications       Reviewed by Veena Connelly (Pharmacy Tech) on 07/18/23 at 1026  Med List Status: Complete     Medication Last Dose Status   aspirin 81 MG EC tablet 7/17/2023 Active   atorvastatin (LIPITOR) 80 MG tablet 7/17/2023 Active   Biotin 18161 MCG Tab 7/17/2023 Active   chlorthalidone (HYGROTON) 25 MG Tab 7/17/2023 Active   naproxen (ALEVE) 220 MG tablet 7/15/2023 Active   telmisartan (MICARDIS) 80 MG  "Tab 7/17/2023 Active   TURMERIC PO 7/17/2023 Active                    ALLERGIES  No Known Allergies    PHYSICAL EXAM  VITAL SIGNS: /67   Pulse 74   Temp 36.6 °C (97.8 °F) (Temporal)   Resp 18   Ht 1.753 m (5' 9\")   Wt (!) 129 kg (283 lb 8.2 oz)   SpO2 96%   BMI 41.87 kg/m²      Pulse ox interpretation: I interpret this pulse ox as normal.  Constitutional: Alert in no apparent distress.  HENT: No signs of trauma, Bilateral external ears normal, Nose normal.   Eyes: Pupils are equal and reactive, Conjunctiva normal, Non-icteric.   Neck: Normal range of motion, No tenderness, Supple, No stridor.   Cardiovascular: Regular rate and rhythm, no murmurs.   Thorax & Lungs: Normal breath sounds, No respiratory distress, No wheezing, left lateral chest wall tenderness.   Abdomen: Bowel sounds normal, Soft, No tenderness, No masses, No pulsatile masses. No peritoneal signs.  Skin: Warm, Dry, No erythema, No rash.   Back: No bony tenderness, No CVA tenderness.   Extremities: Intact distal pulses, No edema, No tenderness, No cyanosis,  Negative Trina's sign.   Musculoskeletal: Good range of motion in all major joints. No tenderness to palpation or major deformities noted.   Neurologic: Alert , Normal motor function, Normal sensory function, No focal deficits noted.   Psychiatric: Affect normal, Judgment normal, Mood normal.               DIAGNOSTIC STUDIES / PROCEDURES  Results for orders placed or performed during the hospital encounter of 07/18/23   CBC WITH DIFFERENTIAL   Result Value Ref Range    WBC 5.3 4.8 - 10.8 K/uL    RBC 4.69 4.20 - 5.40 M/uL    Hemoglobin 13.8 12.0 - 16.0 g/dL    Hematocrit 41.4 37.0 - 47.0 %    MCV 88.3 81.4 - 97.8 fL    MCH 29.4 27.0 - 33.0 pg    MCHC 33.3 32.2 - 35.5 g/dL    RDW 44.3 35.9 - 50.0 fL    Platelet Count 373 164 - 446 K/uL    MPV 10.4 9.0 - 12.9 fL    Neutrophils-Polys 57.70 44.00 - 72.00 %    Lymphocytes 32.10 22.00 - 41.00 %    Monocytes 7.70 0.00 - 13.40 %    Eosinophils " 1.70 0.00 - 6.90 %    Basophils 0.60 0.00 - 1.80 %    Immature Granulocytes 0.20 0.00 - 0.90 %    Nucleated RBC 0.00 0.00 - 0.20 /100 WBC    Neutrophils (Absolute) 3.08 1.82 - 7.42 K/uL    Lymphs (Absolute) 1.71 1.00 - 4.80 K/uL    Monos (Absolute) 0.41 0.00 - 0.85 K/uL    Eos (Absolute) 0.09 0.00 - 0.51 K/uL    Baso (Absolute) 0.03 0.00 - 0.12 K/uL    Immature Granulocytes (abs) 0.01 0.00 - 0.11 K/uL    NRBC (Absolute) 0.00 K/uL   COMP METABOLIC PANEL   Result Value Ref Range    Sodium 140 135 - 145 mmol/L    Potassium 3.1 (L) 3.6 - 5.5 mmol/L    Chloride 101 96 - 112 mmol/L    Co2 26 20 - 33 mmol/L    Anion Gap 13.0 7.0 - 16.0    Glucose 107 (H) 65 - 99 mg/dL    Bun 14 8 - 22 mg/dL    Creatinine 0.99 0.50 - 1.40 mg/dL    Calcium 9.7 8.4 - 10.2 mg/dL    AST(SGOT) 17 12 - 45 U/L    ALT(SGPT) 17 2 - 50 U/L    Alkaline Phosphatase 103 (H) 30 - 99 U/L    Total Bilirubin 0.5 0.1 - 1.5 mg/dL    Albumin 4.4 3.2 - 4.9 g/dL    Total Protein 8.1 6.0 - 8.2 g/dL    Globulin 3.7 (H) 1.9 - 3.5 g/dL    A-G Ratio 1.2 g/dL   TROPONIN   Result Value Ref Range    Troponin T 9 6 - 19 ng/L   D-DIMER   Result Value Ref Range    D-Dimer 0.65 (H) 0.00 - 0.50 ug/mL (FEU)   CORRECTED CALCIUM   Result Value Ref Range    Correct Calcium 9.4 8.5 - 10.5 mg/dL   ESTIMATED GFR   Result Value Ref Range    GFR (CKD-EPI) 64 >60 mL/min/1.73 m 2   EKG   Result Value Ref Range    Report       [facility] Emergency Dept.    Test Date:  2023  Pt Name:    NGUYEN DOWNING                Department: E[facility]M  MRN:        [id number]                      [facility]  Gender:     Female                       Technician: CRUZ  :        1960                   Requested By:ER TRIAGE PROTOCOL  Order #:    [id number]                    Reading MD: WANDY BENNETT MD    Measurements  Intervals                                Axis  Rate:       87                           P:          16  VT:         174                           QRS:        77  QRSD:       96                           T:          -29  QT:         366  QTc:        441    Interpretive Statements  Sinus rhythm  Borderline repolarization abnormality  Baseline wander in lead(s) III,aVF  Compared to ECG 01/13/2022 12:01:35  T-wave abnormality no longer present  Electronically Signed On 07- 13:16:56 PDT by WANDY BENNETT MD           RADIOLOGY  I have independently interpreted the diagnostic imaging associated with this visit and am waiting the final reading from the radiologist.   My preliminary interpretation is as follows: no edema  Radiologist interpretation:   CT-CTA CHEST PULMONARY ARTERY W/ RECONS   Final Result      1.  No evidence of pulmonary embolus.      2.  Bibasilar atelectasis.      3.  Large paraesophageal hernia with majority of the stomach located within the thorax.      DX-CHEST-PORTABLE (1 VIEW)   Final Result      1.  Bibasilar atelectasis.      2.  Cardiomegaly.            COURSE & MEDICAL DECISION MAKING    ED Observation Status? Yes; I am placing the patient in to an observation status due to a diagnostic uncertainty as well as therapeutic intensity. Patient placed in observation status at 9:53 AM, 7/18/2023.     Observation plan is as follows: Telemetry monitoring given patient's symptoms, diagnostic evaluation as below    Upon Reevaluation, the patient's condition has: Improved; and will be discharged.    Patient discharged from ED Observation status at 1:13 PM   (Time) 07/18/23       (Date).     INITIAL ASSESSMENT, COURSE AND PLAN  Care Narrative: 9:53 AM  Patient presenting with chest pain.  At this point differential includes but not limited to acute pathology such as cardiac process, acute coronary syndrome, pulmonary embolism , pneumothorax, dissection (this seems less likely at this time given the nature of the pain no ripping or tearing sensation, equal pulses, no neurologic deficits), chest wall pain, costochondritis, muscle strain,  potential GI process such as esophageal spasm or reflux.  Given this differential we will proceed with further work-up including diagnostic labs, ECG, x-ray.  Have ordered for toradol to treat symptoms.  Additionally we will plan for observation to monitor symptoms as well as response to therapy.    Patient reevaluated, she is more comfortable, pending CTA    1:13 PM  Patient is reevaluated, reports she feels more comfortable, pain improved.  She is updated on all results, agreeable with discharge          ADDITIONAL PROBLEM LIST  #Paraesophageal/hiatal hernia.  No findings of gastric volvulus or obstruction, and her symptoms have improved here.  Will start on PPI, oxycodone as needed for pain over the next few days, referrals placed for follow-up through GI and general surgery    #Chest pain, likely secondary to above.  Pain would be atypical for ACS, she has negative troponin with 4 days of constant symptoms, no ischemia on ECG, CTA shows no evidence of pulmonary embolism dissection or other pulmonary pathology    #Hypokalemia, mild.  Advised dietary repletion      DISPOSITION AND DISCUSSIONS    Barriers to care at this time, including but not limited to:  none .     Decision tools and prescription drugs considered including, but not limited to: HEART Score 3 .    The patient will return for new or worsening symptoms and is stable at the time of discharge.    The patient is referred to a primary physician for blood pressure management, diabetic screening, and for all other preventative health concerns.      DISPOSITION:  Patient will be discharged home in stable condition.    FOLLOW UP:  [facility]  [address]  [place] [address]  [phone]  Schedule an appointment as soon as possible for a visit       [facility]  [address]  [facility] [address] [phone]  Schedule an appointment as soon as possible for a visit         OUTPATIENT MEDICATIONS:  New  Prescriptions    OXYCODONE-ACETAMINOPHEN (PERCOCET) 5-325 MG TAB    Take 1 Tablet by mouth every 6 hours as needed for Moderate Pain (pain) for up to 3 days.    PANTOPRAZOLE (PROTONIX) 40 MG TABLET DELAYED RESPONSE    Take 1 Tablet by mouth every day.         FINAL DIAGNOSIS  1. Paraesophageal hernia    2. Chest pain, unspecified type    3. Hypokalemia           Electronically signed by: Edwin Cespedes M.D., 7/18/2023 9:52 AM

## 2023-07-18 NOTE — ED TRIAGE NOTES
Pt comes in by herself  c/o CP and SOB that started this past Saturday  denies injury or cause   pain getting worse   was seen at  and sent here for further care and treatment

## 2023-07-18 NOTE — DISCHARGE INSTRUCTIONS
Your test today show no dangerous cause for your symptoms.  You do however have a hernia as we discussed.  Please take the Protonix as directed and the pain medicine as needed.  Additionally consider a milder diet.  Your potassium was slightly low as well which you can supplement through dietary foods as above. seek more immediate medical attention for any new or worsening pain, passing out, persistent vomiting, vomiting blood or other concerns

## 2023-07-18 NOTE — ED NOTES
Med rec updated and complete, per pt   Allergies reviewed, per pt  Pt had a list of medications at bedside, went over list and returned pt list of medications back to pt at bedside.  Pt reports that she has not used her ALBUTEROL inhaler in the last 30 days or longer.

## 2023-07-18 NOTE — Clinical Note
Olga Jeong was seen and treated in our emergency department on 7/18/2023.  She may return to work on 07/19/2023.       If you have any questions or concerns, please don't hesitate to call.      Edwin Cespedes M.D.

## 2023-07-18 NOTE — ED NOTES
Pt provided w/ dc paper work and education on new medications. Pt to ambulate out of ER once dressed.

## 2023-08-06 ENCOUNTER — OFFICE VISIT (OUTPATIENT)
Dept: URGENT CARE | Facility: CLINIC | Age: 63
End: 2023-08-06
Payer: COMMERCIAL

## 2023-08-06 VITALS
BODY MASS INDEX: 40.73 KG/M2 | SYSTOLIC BLOOD PRESSURE: 124 MMHG | TEMPERATURE: 98.2 F | HEIGHT: 69 IN | RESPIRATION RATE: 12 BRPM | HEART RATE: 74 BPM | WEIGHT: 275 LBS | DIASTOLIC BLOOD PRESSURE: 76 MMHG

## 2023-08-06 DIAGNOSIS — M79.672 LEFT FOOT PAIN: ICD-10-CM

## 2023-08-06 DIAGNOSIS — M25.50 ARTHRALGIA, UNSPECIFIED JOINT: ICD-10-CM

## 2023-08-06 DIAGNOSIS — M79.89 SWELLING OF LEFT FOOT: ICD-10-CM

## 2023-08-06 PROCEDURE — 3078F DIAST BP <80 MM HG: CPT | Performed by: NURSE PRACTITIONER

## 2023-08-06 PROCEDURE — 99214 OFFICE O/P EST MOD 30 MIN: CPT | Performed by: NURSE PRACTITIONER

## 2023-08-06 PROCEDURE — 3074F SYST BP LT 130 MM HG: CPT | Performed by: NURSE PRACTITIONER

## 2023-08-06 RX ORDER — METHYLPREDNISOLONE 4 MG/1
TABLET ORAL
Qty: 21 TABLET | Refills: 0 | Status: SHIPPED | OUTPATIENT
Start: 2023-08-06 | End: 2023-12-01

## 2023-08-06 ASSESSMENT — ENCOUNTER SYMPTOMS
WEAKNESS: 0
NUMBNESS: 0
CHILLS: 0
FEVER: 0
JOINT SWELLING: 1

## 2023-08-06 ASSESSMENT — FIBROSIS 4 INDEX: FIB4 SCORE: 0.69

## 2023-08-06 NOTE — LETTER
August 6, 2023         Patient: Olga Jeong   YOB: 1960   Date of Visit: 8/6/2023           To Whom it May Concern:    Olga Jeong was seen in my clinic on 8/6/2023. She may return to work on 8/8/23.    If you have any questions or concerns, please don't hesitate to call.        Sincerely,           NADEEM Herr.  Electronically Signed

## 2023-08-07 NOTE — PROGRESS NOTES
Subjective:   Olga Jeong is a 63 y.o. female who presents for Other (FOOT SWOLLEN)      Foot Problem  This is a new problem. The current episode started today (Atraumatic left foot pain with swelling). The problem occurs constantly. The problem has been gradually worsening. Associated symptoms include joint swelling. Pertinent negatives include no chills, fever, numbness, rash or weakness. The symptoms are aggravated by walking. She has tried acetaminophen for the symptoms. The treatment provided no relief.   Denies history of gout    Review of Systems   Constitutional:  Negative for chills and fever.   Musculoskeletal:  Positive for joint pain and joint swelling.   Skin:  Negative for rash.   Neurological:  Negative for weakness and numbness.       Medications:    aspirin  atorvastatin  Biotin Tabs  chlorthalidone Tabs  methylPREDNISolone Tbpk  naproxen  pantoprazole Tbec  telmisartan Tabs  TURMERIC PO    Allergies: Patient has no known allergies.    Problem List: Olga Jeong does not have any pertinent problems on file.    Surgical History:  Past Surgical History:   Procedure Laterality Date    ERCP IN OR N/A 10/10/2015    Procedure: ERCP IN OR;  Surgeon: Jefry Bennett M.D.;  Location: SURGERY [facility];  Service:     CHOLECYSTECTOMY         Past Social Hx: Olga Joeng  reports that she has never smoked. She has never used smokeless tobacco. She reports that she does not drink alcohol and does not use drugs.     Past Family Hx:  Olga Jeong family history includes Stroke in her mother.     Problem list, medications, and allergies reviewed by myself today in Epic.     Objective:     Vitals:    08/06/23 2027   BP: 124/76   Pulse: 74   Resp: 12   Temp: 36.8 °C (98.2 °F)           Physical Exam  Constitutional:       Appearance: Normal appearance. She is not ill-appearing or toxic-appearing.   HENT:      Head: Normocephalic.      Right Ear: External ear normal.       Left Ear: External ear normal.      Nose: Nose normal.      Mouth/Throat:      Lips: Pink.   Eyes:      General: Lids are normal.   Pulmonary:      Effort: Pulmonary effort is normal. No accessory muscle usage.   Musculoskeletal:      Cervical back: Full passive range of motion without pain.      Left foot: Decreased range of motion. Normal capillary refill. Swelling, tenderness and bony tenderness present. No deformity, laceration or crepitus. Normal pulse.      Comments: Tender to palpation to the first MTP joint, skin markedly edematous,    Skin:     Findings: No erythema.   Neurological:      Mental Status: She is alert and oriented to person, place, and time.   Psychiatric:         Mood and Affect: Mood normal.         Thought Content: Thought content normal.         Assessment/Plan:     Diagnosis and associated orders:     1. Left foot pain  DX-FOOT-COMPLETE 3+ LEFT      2. Arthralgia, unspecified joint  methylPREDNISolone (MEDROL DOSEPAK) 4 MG Tablet Therapy Pack      3. Swelling of left foot  methylPREDNISolone (MEDROL DOSEPAK) 4 MG Tablet Therapy Pack         Comments/MDM:     I personally reviewed prior external notes and prior test results pertinent to today's visit.  Patient with atraumatic left foot pain unfortunately no x-ray imaging available at clinic location we will provide order for outpatient imaging results.  Presentation suspect possible inflammatory versus gout patient with history of gout.  Should be treated with steroids as she does have an upcoming procedure and cannot take any NSAID medications.  Recommended Tylenol for pain.  Ace bandage provided encouraged to elevate, ice.  Discussed management options, risks and benefits, and alternatives to treatment plan agreed upon.   Red flags discussed and indications to immediately call 911 or present to the Emergency Department.   Supportive care, differential diagnoses, and indications for immediate follow-up discussed with patient.    Patient  expresses understanding and agrees to plan. Patient denies any other questions or concerns.              Please note that this dictation was created using voice recognition software. I have made a reasonable attempt to correct obvious errors, but I expect that there are errors of grammar and possibly content that I did not discover before finalizing the note.    This note was electronically signed by Darell CARR.

## 2023-08-09 ENCOUNTER — HOSPITAL ENCOUNTER (OUTPATIENT)
Facility: MEDICAL CENTER | Age: 63
End: 2023-08-09
Attending: COLON & RECTAL SURGERY | Admitting: COLON & RECTAL SURGERY
Payer: COMMERCIAL

## 2023-08-09 ENCOUNTER — ANESTHESIA EVENT (OUTPATIENT)
Dept: SURGERY | Facility: MEDICAL CENTER | Age: 63
End: 2023-08-09
Payer: COMMERCIAL

## 2023-08-09 ENCOUNTER — ANESTHESIA (OUTPATIENT)
Dept: SURGERY | Facility: MEDICAL CENTER | Age: 63
End: 2023-08-09
Payer: COMMERCIAL

## 2023-08-09 VITALS
RESPIRATION RATE: 18 BRPM | WEIGHT: 276.24 LBS | DIASTOLIC BLOOD PRESSURE: 83 MMHG | OXYGEN SATURATION: 98 % | TEMPERATURE: 97.1 F | HEART RATE: 54 BPM | BODY MASS INDEX: 40.91 KG/M2 | HEIGHT: 69 IN | SYSTOLIC BLOOD PRESSURE: 137 MMHG

## 2023-08-09 PROCEDURE — 700105 HCHG RX REV CODE 258: Mod: JZ | Performed by: COLON & RECTAL SURGERY

## 2023-08-09 PROCEDURE — 160046 HCHG PACU - 1ST 60 MINS PHASE II: Performed by: COLON & RECTAL SURGERY

## 2023-08-09 PROCEDURE — 700111 HCHG RX REV CODE 636 W/ 250 OVERRIDE (IP): Mod: JZ | Performed by: STUDENT IN AN ORGANIZED HEALTH CARE EDUCATION/TRAINING PROGRAM

## 2023-08-09 PROCEDURE — 160202 HCHG ENDO MINUTES - 1ST 30 MINS LEVEL 3: Performed by: COLON & RECTAL SURGERY

## 2023-08-09 PROCEDURE — 160002 HCHG RECOVERY MINUTES (STAT): Performed by: COLON & RECTAL SURGERY

## 2023-08-09 PROCEDURE — 160025 RECOVERY II MINUTES (STATS): Performed by: COLON & RECTAL SURGERY

## 2023-08-09 PROCEDURE — 160048 HCHG OR STATISTICAL LEVEL 1-5: Performed by: COLON & RECTAL SURGERY

## 2023-08-09 PROCEDURE — 160009 HCHG ANES TIME/MIN: Performed by: COLON & RECTAL SURGERY

## 2023-08-09 PROCEDURE — 160035 HCHG PACU - 1ST 60 MINS PHASE I: Performed by: COLON & RECTAL SURGERY

## 2023-08-09 RX ORDER — ONDANSETRON 2 MG/ML
4 INJECTION INTRAMUSCULAR; INTRAVENOUS
Status: DISCONTINUED | OUTPATIENT
Start: 2023-08-09 | End: 2023-08-09 | Stop reason: HOSPADM

## 2023-08-09 RX ORDER — DIPHENHYDRAMINE HYDROCHLORIDE 50 MG/ML
12.5 INJECTION INTRAMUSCULAR; INTRAVENOUS
Status: DISCONTINUED | OUTPATIENT
Start: 2023-08-09 | End: 2023-08-09 | Stop reason: HOSPADM

## 2023-08-09 RX ORDER — OXYCODONE HCL 5 MG/5 ML
5 SOLUTION, ORAL ORAL
Status: DISCONTINUED | OUTPATIENT
Start: 2023-08-09 | End: 2023-08-09 | Stop reason: HOSPADM

## 2023-08-09 RX ORDER — LABETALOL HYDROCHLORIDE 5 MG/ML
5 INJECTION, SOLUTION INTRAVENOUS
Status: DISCONTINUED | OUTPATIENT
Start: 2023-08-09 | End: 2023-08-09 | Stop reason: HOSPADM

## 2023-08-09 RX ORDER — HALOPERIDOL 5 MG/ML
1 INJECTION INTRAMUSCULAR
Status: DISCONTINUED | OUTPATIENT
Start: 2023-08-09 | End: 2023-08-09 | Stop reason: HOSPADM

## 2023-08-09 RX ORDER — HYDROMORPHONE HYDROCHLORIDE 1 MG/ML
0.1 INJECTION, SOLUTION INTRAMUSCULAR; INTRAVENOUS; SUBCUTANEOUS
Status: DISCONTINUED | OUTPATIENT
Start: 2023-08-09 | End: 2023-08-09 | Stop reason: HOSPADM

## 2023-08-09 RX ORDER — METOPROLOL TARTRATE 1 MG/ML
1 INJECTION, SOLUTION INTRAVENOUS
Status: DISCONTINUED | OUTPATIENT
Start: 2023-08-09 | End: 2023-08-09 | Stop reason: HOSPADM

## 2023-08-09 RX ORDER — EPHEDRINE SULFATE 50 MG/ML
5 INJECTION, SOLUTION INTRAVENOUS
Status: DISCONTINUED | OUTPATIENT
Start: 2023-08-09 | End: 2023-08-09 | Stop reason: HOSPADM

## 2023-08-09 RX ORDER — OXYCODONE HCL 5 MG/5 ML
10 SOLUTION, ORAL ORAL
Status: DISCONTINUED | OUTPATIENT
Start: 2023-08-09 | End: 2023-08-09 | Stop reason: HOSPADM

## 2023-08-09 RX ORDER — SODIUM CHLORIDE, SODIUM LACTATE, POTASSIUM CHLORIDE, CALCIUM CHLORIDE 600; 310; 30; 20 MG/100ML; MG/100ML; MG/100ML; MG/100ML
INJECTION, SOLUTION INTRAVENOUS CONTINUOUS
Status: DISCONTINUED | OUTPATIENT
Start: 2023-08-09 | End: 2023-08-09 | Stop reason: HOSPADM

## 2023-08-09 RX ORDER — HYDROMORPHONE HYDROCHLORIDE 1 MG/ML
0.2 INJECTION, SOLUTION INTRAMUSCULAR; INTRAVENOUS; SUBCUTANEOUS
Status: DISCONTINUED | OUTPATIENT
Start: 2023-08-09 | End: 2023-08-09 | Stop reason: HOSPADM

## 2023-08-09 RX ORDER — HYDROMORPHONE HYDROCHLORIDE 1 MG/ML
0.4 INJECTION, SOLUTION INTRAMUSCULAR; INTRAVENOUS; SUBCUTANEOUS
Status: DISCONTINUED | OUTPATIENT
Start: 2023-08-09 | End: 2023-08-09 | Stop reason: HOSPADM

## 2023-08-09 RX ORDER — HYDRALAZINE HYDROCHLORIDE 20 MG/ML
5 INJECTION INTRAMUSCULAR; INTRAVENOUS
Status: DISCONTINUED | OUTPATIENT
Start: 2023-08-09 | End: 2023-08-09 | Stop reason: HOSPADM

## 2023-08-09 RX ORDER — DICLOFENAC SODIUM 30 MG/G
1 GEL TOPICAL
COMMUNITY
Start: 2023-05-11 | End: 2023-11-06

## 2023-08-09 RX ORDER — MEPERIDINE HYDROCHLORIDE 25 MG/ML
12.5 INJECTION INTRAMUSCULAR; INTRAVENOUS; SUBCUTANEOUS
Status: DISCONTINUED | OUTPATIENT
Start: 2023-08-09 | End: 2023-08-09 | Stop reason: HOSPADM

## 2023-08-09 RX ADMIN — SODIUM CHLORIDE, POTASSIUM CHLORIDE, SODIUM LACTATE AND CALCIUM CHLORIDE: 600; 310; 30; 20 INJECTION, SOLUTION INTRAVENOUS at 09:46

## 2023-08-09 RX ADMIN — PROPOFOL 100 MCG/KG/MIN: 10 INJECTION, EMULSION INTRAVENOUS at 09:46

## 2023-08-09 RX ADMIN — MIDAZOLAM 2 MG: 1 INJECTION, SOLUTION INTRAMUSCULAR; INTRAVENOUS at 09:47

## 2023-08-09 RX ADMIN — FENTANYL CITRATE 25 MCG: 50 INJECTION, SOLUTION INTRAMUSCULAR; INTRAVENOUS at 09:47

## 2023-08-09 RX ADMIN — PROPOFOL 40 MG: 10 INJECTION, EMULSION INTRAVENOUS at 09:48

## 2023-08-09 RX ADMIN — FENTANYL CITRATE 25 MCG: 50 INJECTION, SOLUTION INTRAMUSCULAR; INTRAVENOUS at 09:50

## 2023-08-09 ASSESSMENT — FIBROSIS 4 INDEX: FIB4 SCORE: 0.69

## 2023-08-09 ASSESSMENT — PAIN SCALES - GENERAL: PAIN_LEVEL: 0

## 2023-08-09 NOTE — OR NURSING
1001 Pt arrived to PACU with Anesthesiologist and OR RN. Even, unlabored respirations. VSS. Denies pain. Denies nausea. Sleeping comfortably.    1050 Pt meets phase II criteria. Report called to MICKEY Trejo.     1052 Pt transported to pre op 31 with RN. Chart with patient.

## 2023-08-09 NOTE — ANESTHESIA PREPROCEDURE EVALUATION
Case: [id number] Date/Time: 08/09/23 1149    Procedure: GASTROSCOPY    Location: [place] [address] / SURGERY [facility]    Surgeons: Jonathan Middleton M.D.          Relevant Problems   NEURO   (positive) Headache      CARDIAC   (positive) Hypertensive emergency   (positive) Hypertensive urgency         (positive) Hepatitis       Physical Exam    Airway   Mallampati: II  TM distance: >3 FB  Neck ROM: full       Cardiovascular - normal exam  Rhythm: regular  Rate: normal  (-) murmur     Dental - normal exam           Pulmonary - normal exam  Breath sounds clear to auscultation     Abdominal    Neurological - normal exam                 Anesthesia Plan    ASA 3   ASA physical status 3 criteria: morbid obesity - BMI greater than or equal to 40 and CVA or TIA - history (> 3 months)    Plan - MAC               Induction: intravenous    Postoperative Plan: Postoperative administration of opioids is intended.    Pertinent diagnostic labs and testing reviewed    Informed Consent:    Anesthetic plan and risks discussed with patient.    Use of blood products discussed with: patient whom consented to blood products.

## 2023-08-09 NOTE — OP REPORT
NAME:  Olga Jeong  MRN:  [id number]  :  1960      DATE OF OPERATION: 2023    PREOPERATIVE DIAGNOSIS: Large hiatal hernia, epigastric pain and GERD    POSTOPERATIVE DIAGNOSIS: Large hiatal hernia    OPERATION PERFORMED: Esophagogastroduodenoscopy, WATS brushings    SURGEON: Jonathan Middleton MD    ANESTHESIA: Anesthesiologist: James Morfin M.D.    SPECIMEN: WATS brushings    COMPLICATIONS: None    ESTIMATED BLOOD LOSS: <5cc    INDICATIONS: The patient is a 63 y.o. female with a history of epigastric pain and a large hiatal hernia. She is taken to the operating room today for Esophagogastroduodenoscopy.     DETAILS OF PROCEDURE: After an extensive informed consent discussion and   process, the patient was brought to the operating room and was placed in a supine semi lateral position on the operating room table. After induction of general anesthesia, an orotracheal tube was placed. During the course of the procedure, the patient was monitored continuously with pulse oximetry, telemetry, and intermittent blood pressure readings.     After induction of anesthesia, and placement of a bite block, the gastroscope was slowly introduced and advanced into the esophagus. It was slowly advanced down to the gastroesophageal junction region. The GE junction exhibited normal Zline at 32cm.  WATS brush biopsies of the area were obtained and sent to pathology. The gastroscope passed without difficulty into the body of the stomach, which exhibited a large, twisting hiatal hernia.The gastroscope was then advanced to the pylorus which exhibited no gastritis and no patchy inflammation in the region of the pylorus. The gastroscope was then advanced into the duodenum. The first, second, and third portions of the duodenum appeared normal with no evidence of duodenitis or ulcers.     Slowly, the gastroscope was withdrawn, and the duodenum appeared normal. The   antrum appeared normal. A retroflexion view was performed. This  demonstrated  the hiatal hernia.  The gastroscope was slowly withdrawn as air was aspirated and the area of the proximal stomach and gastroesophageal junction region were again carefully inspected. The gastroesophageal junction and the entire esophagus were carefully inspected as was the esophagus as we withdrew the gastroscope. The gastroscope was then withdrawn.     IMPRESSION/PLAN: Further evaluation with contrast Upper GI series, followed by discussion of management and surgical options.     The patient tolerated the procedure well and there were no apparent complications.  She was awakened and transferred to the recovery area in satisfactory condition.       ____________________________________   Jonathan Middleton MD  DD: 8/9/2023  10:02 AM    CC:  [facility];

## 2023-08-09 NOTE — ANESTHESIA POSTPROCEDURE EVALUATION
Patient: Olga Jeong    Procedure Summary     Date: 08/09/23 Room / Location: [place] [address] / SURGERY [facility]    Anesthesia Start: 0946 Anesthesia Stop: 1003    Procedure: GASTROSCOPY (Mouth) Diagnosis: (Hiatal Hernia)    Surgeons: Jonathan Middleton M.D. Responsible Provider: James Morfin M.D.    Anesthesia Type: MAC ASA Status: 3          Final Anesthesia Type: MAC  Last vitals  BP   Blood Pressure: 107/70    Temp   36.1 °C (96.9 °F)    Pulse   62   Resp   18    SpO2   100 %      Anesthesia Post Evaluation    Patient location during evaluation: PACU  Patient participation: complete - patient participated  Level of consciousness: awake and alert  Pain score: 0    Airway patency: patent  Anesthetic complications: no  Cardiovascular status: hemodynamically stable  Respiratory status: acceptable  Hydration status: euvolemic    PONV: none          No notable events documented.     Nurse Pain Score: 0 (NPRS)

## 2023-08-09 NOTE — DISCHARGE INSTRUCTIONS
EGD D/C instructions:    1. DIET: Upon discharge from the hospital you may resume your normal preoperative diet. Depending on how you are feeling and whether you have nausea or not, you may wish to stay with a bland diet for the first few days. However, you can advance this as quickly as you feel ready.    2. ACTIVITIES: Upon discharge from the hospital, the day of surgery it is requested that you do no significant physical activity and limit mental activities, as you have had sedation. The day after discharge, you may resume full routine activities.     3. DRIVING: You may drive whenever you are off pain medications.    4. BOWEL FUNCTION: Constipation is common after receiving anesthesia. The combination of pain medication and decreased activity level can cause constipation in otherwise normal patients. If you feel this is occurring, take a laxative (Miralax, Milk of Magnesia, Ex-Lax, Senokot, etc.) until the problem has resolved.    5.CALL IF YOU HAVE: (1) Fevers to more than 101.0 F, (2) Unusual chest or leg pain, (3) Excessive bleeding or persistent nausea/vomiting, Please do not hesitate to call with any other questions.     6. APPOINTMENT: Please continue to follow your checklist for bariatric surgery. If a biopsy was performed, Dr. Middleton's office will receive a copy of the report and you can call the office in 1-2 weeks for the results. If a hiatal hernia was detected, this can be repaired at the time of your bariatric surgery. Contact our office at [phone] with any questions or concerns. Our office hours are Monday-Friday, 8am-4:30pm.  Please try to call during these hours when we are better able to assist you.    If you have any additional questions, please do not hesitate to call the office and speak to either myself or the physician on call.    Office address:  [address]  [address] [place] [address] [place] [address]

## 2023-08-09 NOTE — OR NURSING
1053: Pt arrives to phase II room 31. HR in low 50s, other VSS    1102: Dr. Morfin with anesthesia notified of pt HR due to being lower than admission HR.  is OK with pt going home    1120: Discharge instructions reviewed with pt. Pt verbalizes understanding. Copy of instructions given to pt. Pt dressed independently    1142: VSS. IV removed

## 2023-08-09 NOTE — ANESTHESIA TIME REPORT
Anesthesia Start and Stop Event Times     Date Time Event    8/9/2023 0943 Ready for Procedure     0946 Anesthesia Start     1003 Anesthesia Stop        Responsible Staff  08/09/23    Name Role Begin End    James Morfin M.D. Anesth 0946 1003        Overtime Reason:  no overtime (within assigned shift)    Comments:

## 2023-11-27 ENCOUNTER — APPOINTMENT (OUTPATIENT)
Dept: ADMISSIONS | Facility: MEDICAL CENTER | Age: 63
End: 2023-11-27
Attending: COLON & RECTAL SURGERY
Payer: COMMERCIAL

## 2023-12-01 ENCOUNTER — PRE-ADMISSION TESTING (OUTPATIENT)
Dept: ADMISSIONS | Facility: MEDICAL CENTER | Age: 63
End: 2023-12-01
Attending: COLON & RECTAL SURGERY
Payer: COMMERCIAL

## 2023-12-01 RX ORDER — PANTOPRAZOLE SODIUM 40 MG/1
40 FOR SUSPENSION ORAL DAILY
Status: ON HOLD | COMMUNITY
End: 2023-12-06

## 2023-12-05 ENCOUNTER — PRE-ADMISSION TESTING (OUTPATIENT)
Dept: ADMISSIONS | Facility: MEDICAL CENTER | Age: 63
End: 2023-12-05
Attending: COLON & RECTAL SURGERY
Payer: COMMERCIAL

## 2023-12-05 DIAGNOSIS — Z01.812 PRE-OPERATIVE LABORATORY EXAMINATION: ICD-10-CM

## 2023-12-05 LAB
ANION GAP SERPL CALC-SCNC: 11 MMOL/L (ref 7–16)
BUN SERPL-MCNC: 13 MG/DL (ref 8–22)
CALCIUM SERPL-MCNC: 8.9 MG/DL (ref 8.5–10.5)
CHLORIDE SERPL-SCNC: 106 MMOL/L (ref 96–112)
CO2 SERPL-SCNC: 25 MMOL/L (ref 20–33)
CREAT SERPL-MCNC: 0.79 MG/DL (ref 0.5–1.4)
ERYTHROCYTE [DISTWIDTH] IN BLOOD BY AUTOMATED COUNT: 45.8 FL (ref 35.9–50)
GFR SERPLBLD CREATININE-BSD FMLA CKD-EPI: 84 ML/MIN/1.73 M 2
GLUCOSE SERPL-MCNC: 105 MG/DL (ref 65–99)
HCT VFR BLD AUTO: 38.2 % (ref 37–47)
HGB BLD-MCNC: 13 G/DL (ref 12–16)
MCH RBC QN AUTO: 29.7 PG (ref 27–33)
MCHC RBC AUTO-ENTMCNC: 34 G/DL (ref 32.2–35.5)
MCV RBC AUTO: 87.4 FL (ref 81.4–97.8)
PLATELET # BLD AUTO: 350 K/UL (ref 164–446)
PMV BLD AUTO: 10.4 FL (ref 9–12.9)
POTASSIUM SERPL-SCNC: 3.6 MMOL/L (ref 3.6–5.5)
RBC # BLD AUTO: 4.37 M/UL (ref 4.2–5.4)
SODIUM SERPL-SCNC: 142 MMOL/L (ref 135–145)
WBC # BLD AUTO: 3.8 K/UL (ref 4.8–10.8)

## 2023-12-05 PROCEDURE — 85027 COMPLETE CBC AUTOMATED: CPT

## 2023-12-05 PROCEDURE — 36415 COLL VENOUS BLD VENIPUNCTURE: CPT

## 2023-12-05 PROCEDURE — 80048 BASIC METABOLIC PNL TOTAL CA: CPT

## 2023-12-06 ENCOUNTER — ANESTHESIA (OUTPATIENT)
Dept: SURGERY | Facility: MEDICAL CENTER | Age: 63
End: 2023-12-06
Payer: COMMERCIAL

## 2023-12-06 ENCOUNTER — HOSPITAL ENCOUNTER (OUTPATIENT)
Facility: MEDICAL CENTER | Age: 63
End: 2023-12-08
Attending: COLON & RECTAL SURGERY | Admitting: COLON & RECTAL SURGERY
Payer: COMMERCIAL

## 2023-12-06 ENCOUNTER — ANESTHESIA EVENT (OUTPATIENT)
Dept: SURGERY | Facility: MEDICAL CENTER | Age: 63
End: 2023-12-06
Payer: COMMERCIAL

## 2023-12-06 DIAGNOSIS — G89.18 POSTOPERATIVE PAIN: ICD-10-CM

## 2023-12-06 DIAGNOSIS — J95.821 POSTOPERATIVE RESPIRATORY FAILURE (HCC): ICD-10-CM

## 2023-12-06 DIAGNOSIS — E66.01 MORBID OBESITY (HCC): ICD-10-CM

## 2023-12-06 DIAGNOSIS — R09.02 HYPOXIA: ICD-10-CM

## 2023-12-06 DIAGNOSIS — R11.0 NAUSEA: ICD-10-CM

## 2023-12-06 DIAGNOSIS — Z98.890 STATUS POST NISSEN FUNDOPLICATION (WITHOUT GASTROSTOMY TUBE) PROCEDURE: ICD-10-CM

## 2023-12-06 PROCEDURE — 160002 HCHG RECOVERY MINUTES (STAT): Performed by: COLON & RECTAL SURGERY

## 2023-12-06 PROCEDURE — 700111 HCHG RX REV CODE 636 W/ 250 OVERRIDE (IP): Performed by: ANESTHESIOLOGY

## 2023-12-06 PROCEDURE — 700105 HCHG RX REV CODE 258: Performed by: ANESTHESIOLOGY

## 2023-12-06 PROCEDURE — 160048 HCHG OR STATISTICAL LEVEL 1-5: Performed by: COLON & RECTAL SURGERY

## 2023-12-06 PROCEDURE — G0378 HOSPITAL OBSERVATION PER HR: HCPCS

## 2023-12-06 PROCEDURE — 700102 HCHG RX REV CODE 250 W/ 637 OVERRIDE(OP): Performed by: STUDENT IN AN ORGANIZED HEALTH CARE EDUCATION/TRAINING PROGRAM

## 2023-12-06 PROCEDURE — C1781 MESH (IMPLANTABLE): HCPCS | Performed by: COLON & RECTAL SURGERY

## 2023-12-06 PROCEDURE — A9270 NON-COVERED ITEM OR SERVICE: HCPCS | Performed by: STUDENT IN AN ORGANIZED HEALTH CARE EDUCATION/TRAINING PROGRAM

## 2023-12-06 PROCEDURE — 700101 HCHG RX REV CODE 250: Performed by: STUDENT IN AN ORGANIZED HEALTH CARE EDUCATION/TRAINING PROGRAM

## 2023-12-06 PROCEDURE — 700105 HCHG RX REV CODE 258: Performed by: STUDENT IN AN ORGANIZED HEALTH CARE EDUCATION/TRAINING PROGRAM

## 2023-12-06 PROCEDURE — 700111 HCHG RX REV CODE 636 W/ 250 OVERRIDE (IP): Performed by: STUDENT IN AN ORGANIZED HEALTH CARE EDUCATION/TRAINING PROGRAM

## 2023-12-06 PROCEDURE — 160009 HCHG ANES TIME/MIN: Performed by: COLON & RECTAL SURGERY

## 2023-12-06 PROCEDURE — 96374 THER/PROPH/DIAG INJ IV PUSH: CPT

## 2023-12-06 PROCEDURE — 96376 TX/PRO/DX INJ SAME DRUG ADON: CPT

## 2023-12-06 PROCEDURE — 700101 HCHG RX REV CODE 250: Performed by: COLON & RECTAL SURGERY

## 2023-12-06 PROCEDURE — 160035 HCHG PACU - 1ST 60 MINS PHASE I: Performed by: COLON & RECTAL SURGERY

## 2023-12-06 PROCEDURE — 700105 HCHG RX REV CODE 258: Performed by: COLON & RECTAL SURGERY

## 2023-12-06 PROCEDURE — 160041 HCHG SURGERY MINUTES - EA ADDL 1 MIN LEVEL 4: Performed by: COLON & RECTAL SURGERY

## 2023-12-06 PROCEDURE — 700111 HCHG RX REV CODE 636 W/ 250 OVERRIDE (IP): Mod: JZ | Performed by: STUDENT IN AN ORGANIZED HEALTH CARE EDUCATION/TRAINING PROGRAM

## 2023-12-06 PROCEDURE — 700101 HCHG RX REV CODE 250: Performed by: ANESTHESIOLOGY

## 2023-12-06 PROCEDURE — 160036 HCHG PACU - EA ADDL 30 MINS PHASE I: Performed by: COLON & RECTAL SURGERY

## 2023-12-06 PROCEDURE — 160029 HCHG SURGERY MINUTES - 1ST 30 MINS LEVEL 4: Performed by: COLON & RECTAL SURGERY

## 2023-12-06 DEVICE — MESH OVITEX 6 X 10CM RESORBABLE (1/EA): Type: IMPLANTABLE DEVICE | Site: ABDOMEN | Status: FUNCTIONAL

## 2023-12-06 RX ORDER — LIDOCAINE HYDROCHLORIDE 10 MG/ML
INJECTION, SOLUTION EPIDURAL; INFILTRATION; INTRACAUDAL; PERINEURAL PRN
Status: DISCONTINUED | OUTPATIENT
Start: 2023-12-06 | End: 2023-12-06 | Stop reason: SURG

## 2023-12-06 RX ORDER — DEXAMETHASONE SODIUM PHOSPHATE 4 MG/ML
INJECTION, SOLUTION INTRA-ARTICULAR; INTRALESIONAL; INTRAMUSCULAR; INTRAVENOUS; SOFT TISSUE PRN
Status: DISCONTINUED | OUTPATIENT
Start: 2023-12-06 | End: 2023-12-06 | Stop reason: SURG

## 2023-12-06 RX ORDER — CALCIUM CARBONATE 500 MG/1
500 TABLET, CHEWABLE ORAL
Status: DISCONTINUED | OUTPATIENT
Start: 2023-12-06 | End: 2023-12-08 | Stop reason: HOSPADM

## 2023-12-06 RX ORDER — ACETAMINOPHEN 500 MG
1000 TABLET ORAL EVERY 6 HOURS PRN
Status: DISCONTINUED | OUTPATIENT
Start: 2023-12-11 | End: 2023-12-08 | Stop reason: HOSPADM

## 2023-12-06 RX ORDER — SUCCINYLCHOLINE CHLORIDE 20 MG/ML
INJECTION INTRAMUSCULAR; INTRAVENOUS PRN
Status: DISCONTINUED | OUTPATIENT
Start: 2023-12-06 | End: 2023-12-06 | Stop reason: SURG

## 2023-12-06 RX ORDER — LIDOCAINE HYDROCHLORIDE 40 MG/ML
SOLUTION TOPICAL PRN
Status: DISCONTINUED | OUTPATIENT
Start: 2023-12-06 | End: 2023-12-06 | Stop reason: SURG

## 2023-12-06 RX ORDER — HYDROMORPHONE HYDROCHLORIDE 1 MG/ML
0.5 INJECTION, SOLUTION INTRAMUSCULAR; INTRAVENOUS; SUBCUTANEOUS
Status: DISCONTINUED | OUTPATIENT
Start: 2023-12-06 | End: 2023-12-08 | Stop reason: HOSPADM

## 2023-12-06 RX ORDER — M-VIT,TX,IRON,MINS/CALC/FOLIC 27MG-0.4MG
1 TABLET ORAL DAILY
COMMUNITY

## 2023-12-06 RX ORDER — ENALAPRILAT 1.25 MG/ML
2.5 INJECTION INTRAVENOUS EVERY 6 HOURS PRN
Status: DISCONTINUED | OUTPATIENT
Start: 2023-12-06 | End: 2023-12-08 | Stop reason: HOSPADM

## 2023-12-06 RX ORDER — LABETALOL HYDROCHLORIDE 5 MG/ML
5 INJECTION, SOLUTION INTRAVENOUS
Status: DISCONTINUED | OUTPATIENT
Start: 2023-12-06 | End: 2023-12-06 | Stop reason: HOSPADM

## 2023-12-06 RX ORDER — OXYCODONE HCL 5 MG/5 ML
5 SOLUTION, ORAL ORAL
Status: COMPLETED | OUTPATIENT
Start: 2023-12-06 | End: 2023-12-06

## 2023-12-06 RX ORDER — MEPERIDINE HYDROCHLORIDE 25 MG/ML
6.25 INJECTION INTRAMUSCULAR; INTRAVENOUS; SUBCUTANEOUS
Status: DISCONTINUED | OUTPATIENT
Start: 2023-12-06 | End: 2023-12-06 | Stop reason: HOSPADM

## 2023-12-06 RX ORDER — DIPHENHYDRAMINE HCL 25 MG
25 TABLET ORAL EVERY 6 HOURS PRN
Status: DISCONTINUED | OUTPATIENT
Start: 2023-12-06 | End: 2023-12-08 | Stop reason: HOSPADM

## 2023-12-06 RX ORDER — HALOPERIDOL 5 MG/ML
1 INJECTION INTRAMUSCULAR EVERY 6 HOURS PRN
Status: DISCONTINUED | OUTPATIENT
Start: 2023-12-06 | End: 2023-12-08 | Stop reason: HOSPADM

## 2023-12-06 RX ORDER — HYDRALAZINE HYDROCHLORIDE 20 MG/ML
5 INJECTION INTRAMUSCULAR; INTRAVENOUS
Status: DISCONTINUED | OUTPATIENT
Start: 2023-12-06 | End: 2023-12-06 | Stop reason: HOSPADM

## 2023-12-06 RX ORDER — METOPROLOL TARTRATE 1 MG/ML
1 INJECTION, SOLUTION INTRAVENOUS
Status: DISCONTINUED | OUTPATIENT
Start: 2023-12-06 | End: 2023-12-06 | Stop reason: HOSPADM

## 2023-12-06 RX ORDER — SODIUM CHLORIDE, SODIUM LACTATE, POTASSIUM CHLORIDE, AND CALCIUM CHLORIDE .6; .31; .03; .02 G/100ML; G/100ML; G/100ML; G/100ML
500 INJECTION, SOLUTION INTRAVENOUS
Status: DISCONTINUED | OUTPATIENT
Start: 2023-12-06 | End: 2023-12-08 | Stop reason: HOSPADM

## 2023-12-06 RX ORDER — OXYCODONE HCL 5 MG/5 ML
10 SOLUTION, ORAL ORAL
Status: DISCONTINUED | OUTPATIENT
Start: 2023-12-06 | End: 2023-12-08 | Stop reason: HOSPADM

## 2023-12-06 RX ORDER — ONDANSETRON 2 MG/ML
4 INJECTION INTRAMUSCULAR; INTRAVENOUS EVERY 4 HOURS PRN
Status: DISCONTINUED | OUTPATIENT
Start: 2023-12-06 | End: 2023-12-08 | Stop reason: HOSPADM

## 2023-12-06 RX ORDER — PHENYLEPHRINE HCL IN 0.9% NACL 0.5 MG/5ML
SYRINGE (ML) INTRAVENOUS PRN
Status: DISCONTINUED | OUTPATIENT
Start: 2023-12-06 | End: 2023-12-06 | Stop reason: SURG

## 2023-12-06 RX ORDER — ENOXAPARIN SODIUM 100 MG/ML
40 INJECTION SUBCUTANEOUS
Status: DISCONTINUED | OUTPATIENT
Start: 2023-12-07 | End: 2023-12-08 | Stop reason: HOSPADM

## 2023-12-06 RX ORDER — DIPHENHYDRAMINE HYDROCHLORIDE 50 MG/ML
12.5 INJECTION INTRAMUSCULAR; INTRAVENOUS EVERY 6 HOURS PRN
Status: DISCONTINUED | OUTPATIENT
Start: 2023-12-06 | End: 2023-12-08 | Stop reason: HOSPADM

## 2023-12-06 RX ORDER — OXYCODONE HCL 5 MG/5 ML
5 SOLUTION, ORAL ORAL
Status: DISCONTINUED | OUTPATIENT
Start: 2023-12-06 | End: 2023-12-08 | Stop reason: HOSPADM

## 2023-12-06 RX ORDER — ONDANSETRON 2 MG/ML
4 INJECTION INTRAMUSCULAR; INTRAVENOUS
Status: DISCONTINUED | OUTPATIENT
Start: 2023-12-06 | End: 2023-12-06 | Stop reason: HOSPADM

## 2023-12-06 RX ORDER — ROCURONIUM BROMIDE 10 MG/ML
INJECTION, SOLUTION INTRAVENOUS PRN
Status: DISCONTINUED | OUTPATIENT
Start: 2023-12-06 | End: 2023-12-06 | Stop reason: SURG

## 2023-12-06 RX ORDER — ACETAMINOPHEN 10 MG/ML
1000 INJECTION, SOLUTION INTRAVENOUS EVERY 6 HOURS
Status: COMPLETED | OUTPATIENT
Start: 2023-12-06 | End: 2023-12-07

## 2023-12-06 RX ORDER — ACETAMINOPHEN 500 MG
1000 TABLET ORAL EVERY 6 HOURS
Status: DISCONTINUED | OUTPATIENT
Start: 2023-12-07 | End: 2023-12-08 | Stop reason: HOSPADM

## 2023-12-06 RX ORDER — LABETALOL HYDROCHLORIDE 5 MG/ML
20 INJECTION, SOLUTION INTRAVENOUS EVERY 4 HOURS PRN
Status: DISCONTINUED | OUTPATIENT
Start: 2023-12-06 | End: 2023-12-08 | Stop reason: HOSPADM

## 2023-12-06 RX ORDER — DIPHENHYDRAMINE HYDROCHLORIDE 50 MG/ML
12.5 INJECTION INTRAMUSCULAR; INTRAVENOUS
Status: DISCONTINUED | OUTPATIENT
Start: 2023-12-06 | End: 2023-12-06 | Stop reason: HOSPADM

## 2023-12-06 RX ORDER — HYDROMORPHONE HYDROCHLORIDE 1 MG/ML
0.2 INJECTION, SOLUTION INTRAMUSCULAR; INTRAVENOUS; SUBCUTANEOUS
Status: DISCONTINUED | OUTPATIENT
Start: 2023-12-06 | End: 2023-12-06 | Stop reason: HOSPADM

## 2023-12-06 RX ORDER — OXYCODONE HCL 5 MG/5 ML
10 SOLUTION, ORAL ORAL
Status: COMPLETED | OUTPATIENT
Start: 2023-12-06 | End: 2023-12-06

## 2023-12-06 RX ORDER — MIDAZOLAM HYDROCHLORIDE 1 MG/ML
INJECTION INTRAMUSCULAR; INTRAVENOUS PRN
Status: DISCONTINUED | OUTPATIENT
Start: 2023-12-06 | End: 2023-12-06 | Stop reason: SURG

## 2023-12-06 RX ORDER — SODIUM CHLORIDE, SODIUM LACTATE, POTASSIUM CHLORIDE, CALCIUM CHLORIDE 600; 310; 30; 20 MG/100ML; MG/100ML; MG/100ML; MG/100ML
INJECTION, SOLUTION INTRAVENOUS CONTINUOUS
Status: DISCONTINUED | OUTPATIENT
Start: 2023-12-06 | End: 2023-12-06 | Stop reason: HOSPADM

## 2023-12-06 RX ORDER — HALOPERIDOL 5 MG/ML
1 INJECTION INTRAMUSCULAR
Status: DISCONTINUED | OUTPATIENT
Start: 2023-12-06 | End: 2023-12-06 | Stop reason: HOSPADM

## 2023-12-06 RX ORDER — HYDROMORPHONE HYDROCHLORIDE 1 MG/ML
0.4 INJECTION, SOLUTION INTRAMUSCULAR; INTRAVENOUS; SUBCUTANEOUS
Status: DISCONTINUED | OUTPATIENT
Start: 2023-12-06 | End: 2023-12-06 | Stop reason: HOSPADM

## 2023-12-06 RX ORDER — DIPHENHYDRAMINE HYDROCHLORIDE 50 MG/ML
25 INJECTION INTRAMUSCULAR; INTRAVENOUS EVERY 6 HOURS PRN
Status: DISCONTINUED | OUTPATIENT
Start: 2023-12-06 | End: 2023-12-08 | Stop reason: HOSPADM

## 2023-12-06 RX ORDER — EPHEDRINE SULFATE 50 MG/ML
5 INJECTION, SOLUTION INTRAVENOUS
Status: DISCONTINUED | OUTPATIENT
Start: 2023-12-06 | End: 2023-12-06 | Stop reason: HOSPADM

## 2023-12-06 RX ORDER — SODIUM CHLORIDE, SODIUM LACTATE, POTASSIUM CHLORIDE, CALCIUM CHLORIDE 600; 310; 30; 20 MG/100ML; MG/100ML; MG/100ML; MG/100ML
INJECTION, SOLUTION INTRAVENOUS CONTINUOUS
Status: ACTIVE | OUTPATIENT
Start: 2023-12-06 | End: 2023-12-06

## 2023-12-06 RX ORDER — PROMETHAZINE HYDROCHLORIDE 25 MG/1
25 SUPPOSITORY RECTAL EVERY 4 HOURS PRN
Status: DISCONTINUED | OUTPATIENT
Start: 2023-12-06 | End: 2023-12-08 | Stop reason: HOSPADM

## 2023-12-06 RX ORDER — CEFAZOLIN SODIUM 1 G/3ML
INJECTION, POWDER, FOR SOLUTION INTRAMUSCULAR; INTRAVENOUS PRN
Status: DISCONTINUED | OUTPATIENT
Start: 2023-12-06 | End: 2023-12-06 | Stop reason: SURG

## 2023-12-06 RX ORDER — EPHEDRINE SULFATE 50 MG/ML
INJECTION, SOLUTION INTRAVENOUS PRN
Status: DISCONTINUED | OUTPATIENT
Start: 2023-12-06 | End: 2023-12-06 | Stop reason: SURG

## 2023-12-06 RX ORDER — BUPIVACAINE HYDROCHLORIDE AND EPINEPHRINE 5; 5 MG/ML; UG/ML
INJECTION, SOLUTION EPIDURAL; INTRACAUDAL; PERINEURAL
Status: DISCONTINUED | OUTPATIENT
Start: 2023-12-06 | End: 2023-12-06 | Stop reason: HOSPADM

## 2023-12-06 RX ORDER — ACETAMINOPHEN 500 MG
1000 TABLET ORAL ONCE
Status: COMPLETED | OUTPATIENT
Start: 2023-12-06 | End: 2023-12-06

## 2023-12-06 RX ORDER — HYDROMORPHONE HYDROCHLORIDE 1 MG/ML
0.1 INJECTION, SOLUTION INTRAMUSCULAR; INTRAVENOUS; SUBCUTANEOUS
Status: DISCONTINUED | OUTPATIENT
Start: 2023-12-06 | End: 2023-12-06 | Stop reason: HOSPADM

## 2023-12-06 RX ADMIN — Medication 100 MCG: at 10:10

## 2023-12-06 RX ADMIN — FENTANYL CITRATE 25 MCG: 50 INJECTION, SOLUTION INTRAMUSCULAR; INTRAVENOUS at 09:44

## 2023-12-06 RX ADMIN — OXYCODONE HYDROCHLORIDE 10 MG: 5 SOLUTION ORAL at 11:17

## 2023-12-06 RX ADMIN — LIDOCAINE HYDROCHLORIDE 0.5 ML: 10 INJECTION, SOLUTION EPIDURAL; INFILTRATION; INTRACAUDAL at 08:40

## 2023-12-06 RX ADMIN — OXYCODONE HYDROCHLORIDE 10 MG: 5 SOLUTION ORAL at 16:21

## 2023-12-06 RX ADMIN — PROPOFOL 180 MG: 10 INJECTION, EMULSION INTRAVENOUS at 09:44

## 2023-12-06 RX ADMIN — FAMOTIDINE 20 MG: 10 INJECTION, SOLUTION INTRAVENOUS at 18:47

## 2023-12-06 RX ADMIN — FENTANYL CITRATE 50 MCG: 50 INJECTION, SOLUTION INTRAMUSCULAR; INTRAVENOUS at 10:00

## 2023-12-06 RX ADMIN — FENTANYL CITRATE 50 MCG: 50 INJECTION, SOLUTION INTRAMUSCULAR; INTRAVENOUS at 12:46

## 2023-12-06 RX ADMIN — SUCCINYLCHOLINE CHLORIDE 200 MG: 20 INJECTION, SOLUTION INTRAMUSCULAR; INTRAVENOUS at 09:44

## 2023-12-06 RX ADMIN — DEXAMETHASONE SODIUM PHOSPHATE 8 MG: 4 INJECTION INTRA-ARTICULAR; INTRALESIONAL; INTRAMUSCULAR; INTRAVENOUS; SOFT TISSUE at 09:50

## 2023-12-06 RX ADMIN — ROCURONIUM BROMIDE 50 MG: 50 INJECTION, SOLUTION INTRAVENOUS at 09:44

## 2023-12-06 RX ADMIN — FENTANYL CITRATE 50 MCG: 50 INJECTION, SOLUTION INTRAMUSCULAR; INTRAVENOUS at 11:18

## 2023-12-06 RX ADMIN — EPHEDRINE SULFATE 10 MG: 50 INJECTION, SOLUTION INTRAVENOUS at 10:07

## 2023-12-06 RX ADMIN — POTASSIUM CHLORIDE: 149 INJECTION, SOLUTION, CONCENTRATE INTRAVENOUS at 16:39

## 2023-12-06 RX ADMIN — MIDAZOLAM HYDROCHLORIDE 2 MG: 1 INJECTION, SOLUTION INTRAMUSCULAR; INTRAVENOUS at 09:41

## 2023-12-06 RX ADMIN — LIDOCAINE HYDROCHLORIDE 4 ML: 40 SOLUTION TOPICAL at 09:45

## 2023-12-06 RX ADMIN — ACETAMINOPHEN 1000 MG: 10 INJECTION, SOLUTION INTRAVENOUS at 18:47

## 2023-12-06 RX ADMIN — SODIUM CHLORIDE, POTASSIUM CHLORIDE, SODIUM LACTATE AND CALCIUM CHLORIDE: 600; 310; 30; 20 INJECTION, SOLUTION INTRAVENOUS at 08:40

## 2023-12-06 RX ADMIN — HYDROMORPHONE HYDROCHLORIDE 0.4 MG: 1 INJECTION, SOLUTION INTRAMUSCULAR; INTRAVENOUS; SUBCUTANEOUS at 11:50

## 2023-12-06 RX ADMIN — FENTANYL CITRATE 50 MCG: 50 INJECTION, SOLUTION INTRAMUSCULAR; INTRAVENOUS at 10:37

## 2023-12-06 RX ADMIN — HYDROMORPHONE HYDROCHLORIDE 0.4 MG: 1 INJECTION, SOLUTION INTRAMUSCULAR; INTRAVENOUS; SUBCUTANEOUS at 11:38

## 2023-12-06 RX ADMIN — FENTANYL CITRATE 50 MCG: 50 INJECTION, SOLUTION INTRAMUSCULAR; INTRAVENOUS at 11:30

## 2023-12-06 RX ADMIN — HYDROMORPHONE HYDROCHLORIDE 0.4 MG: 1 INJECTION, SOLUTION INTRAMUSCULAR; INTRAVENOUS; SUBCUTANEOUS at 12:15

## 2023-12-06 RX ADMIN — ACETAMINOPHEN 1000 MG: 500 TABLET, FILM COATED ORAL at 08:40

## 2023-12-06 RX ADMIN — LIDOCAINE HYDROCHLORIDE 50 MG: 10 INJECTION, SOLUTION EPIDURAL; INFILTRATION; INTRACAUDAL; PERINEURAL at 09:44

## 2023-12-06 RX ADMIN — SUGAMMADEX 200 MG: 100 INJECTION, SOLUTION INTRAVENOUS at 11:33

## 2023-12-06 RX ADMIN — METHOCARBAMOL 1000 MG: 100 INJECTION, SOLUTION INTRAMUSCULAR; INTRAVENOUS at 12:28

## 2023-12-06 RX ADMIN — HYDROMORPHONE HYDROCHLORIDE 0.4 MG: 1 INJECTION, SOLUTION INTRAMUSCULAR; INTRAVENOUS; SUBCUTANEOUS at 11:44

## 2023-12-06 RX ADMIN — FENTANYL CITRATE 50 MCG: 50 INJECTION, SOLUTION INTRAMUSCULAR; INTRAVENOUS at 10:45

## 2023-12-06 RX ADMIN — Medication 100 MCG: at 10:08

## 2023-12-06 RX ADMIN — FENTANYL CITRATE 25 MCG: 50 INJECTION, SOLUTION INTRAMUSCULAR; INTRAVENOUS at 10:15

## 2023-12-06 RX ADMIN — ONDANSETRON 4 MG: 2 INJECTION INTRAMUSCULAR; INTRAVENOUS at 16:23

## 2023-12-06 RX ADMIN — OXYCODONE HYDROCHLORIDE 10 MG: 5 SOLUTION ORAL at 16:14

## 2023-12-06 RX ADMIN — Medication 100 MCG: at 10:24

## 2023-12-06 RX ADMIN — OXYCODONE HYDROCHLORIDE 10 MG: 5 SOLUTION ORAL at 21:00

## 2023-12-06 RX ADMIN — HYDROMORPHONE HYDROCHLORIDE 0.4 MG: 1 INJECTION, SOLUTION INTRAMUSCULAR; INTRAVENOUS; SUBCUTANEOUS at 12:21

## 2023-12-06 RX ADMIN — CEFAZOLIN 3 G: 1 INJECTION, POWDER, FOR SOLUTION INTRAMUSCULAR; INTRAVENOUS at 09:48

## 2023-12-06 ASSESSMENT — COGNITIVE AND FUNCTIONAL STATUS - GENERAL
DAILY ACTIVITIY SCORE: 22
MOVING FROM LYING ON BACK TO SITTING ON SIDE OF FLAT BED: A LITTLE
MOVING TO AND FROM BED TO CHAIR: A LITTLE
CLIMB 3 TO 5 STEPS WITH RAILING: A LITTLE
MOBILITY SCORE: 18
TURNING FROM BACK TO SIDE WHILE IN FLAT BAD: A LITTLE
STANDING UP FROM CHAIR USING ARMS: A LITTLE
SUGGESTED CMS G CODE MODIFIER MOBILITY: CK
SUGGESTED CMS G CODE MODIFIER DAILY ACTIVITY: CJ
WALKING IN HOSPITAL ROOM: A LITTLE
DRESSING REGULAR LOWER BODY CLOTHING: A LITTLE
DRESSING REGULAR UPPER BODY CLOTHING: A LITTLE

## 2023-12-06 ASSESSMENT — LIFESTYLE VARIABLES
DOES PATIENT WANT TO STOP DRINKING: NO
ALCOHOL_USE: NO
AVERAGE NUMBER OF DAYS PER WEEK YOU HAVE A DRINK CONTAINING ALCOHOL: 0
TOTAL SCORE: 0
ON A TYPICAL DAY WHEN YOU DRINK ALCOHOL HOW MANY DRINKS DO YOU HAVE: 0
HAVE YOU EVER FELT YOU SHOULD CUT DOWN ON YOUR DRINKING: NO
CONSUMPTION TOTAL: NEGATIVE
EVER HAD A DRINK FIRST THING IN THE MORNING TO STEADY YOUR NERVES TO GET RID OF A HANGOVER: NO
EVER FELT BAD OR GUILTY ABOUT YOUR DRINKING: NO
TOTAL SCORE: 0
TOTAL SCORE: 0
HAVE PEOPLE ANNOYED YOU BY CRITICIZING YOUR DRINKING: NO
HOW MANY TIMES IN THE PAST YEAR HAVE YOU HAD 5 OR MORE DRINKS IN A DAY: 0

## 2023-12-06 ASSESSMENT — PATIENT HEALTH QUESTIONNAIRE - PHQ9
2. FEELING DOWN, DEPRESSED, IRRITABLE, OR HOPELESS: NOT AT ALL
SUM OF ALL RESPONSES TO PHQ9 QUESTIONS 1 AND 2: 0
1. LITTLE INTEREST OR PLEASURE IN DOING THINGS: NOT AT ALL

## 2023-12-06 ASSESSMENT — FIBROSIS 4 INDEX
FIB4 SCORE: 0.73
FIB4 SCORE: 0.73

## 2023-12-06 ASSESSMENT — PAIN DESCRIPTION - PAIN TYPE
TYPE: ACUTE PAIN
TYPE: SURGICAL PAIN
TYPE: ACUTE PAIN;SURGICAL PAIN
TYPE: SURGICAL PAIN
TYPE: SURGICAL PAIN;ACUTE PAIN
TYPE: SURGICAL PAIN
TYPE: SURGICAL PAIN
TYPE: ACUTE PAIN;SURGICAL PAIN
TYPE: ACUTE PAIN
TYPE: SURGICAL PAIN
TYPE: SURGICAL PAIN;ACUTE PAIN

## 2023-12-06 NOTE — PROGRESS NOTES
Medication history reviewed with PT at bedside    Med rec is complete per PT reporting    Allergies reviewed.     Patient denies any outpatient antibiotics in the last 30 days.     Patient is not taking anticoagulants.    Preferred pharmacy for this visit - [facility] ([phone])

## 2023-12-06 NOTE — OR NURSING
Report called to receiving MICKEY Wolff. Pt taken via [employer] [place] to T[facility]. VSS. No distress. One bag of personal belongings sent w/ pt.

## 2023-12-06 NOTE — PROGRESS NOTES
Assumed care of patient at 1445. Telephone report received. Patient arrived from PACU    Assessment complete.   AA&Ox4. Patient is tearful r/t pain and feeling overwhelmed. Emotional support and encouragement provided to patient. Family bedside providing emotional support to patient   Denies CP/SOB. 2L     Reporting pain. Recently medicated for pain. Ice applied, repositioned in the bed. Educated patient regarding pharmacologic and non pharmacologic modalities for pain management.    Skin per flow sheets. 5 lab sites to abdomen steri strips and Band-Aid, CDI, all other skin intact    Tolerating sips. Denies N/V.  + void now. - BM.     Pt ambulates hand held assist. Patient ambulated to hallway to bathroom, to bed.  Fall prevention measures in place per flowsheets.    SCD's in use/Educated on SCD use, Pharmacologic VTE prophylaxis in use.    Plan of care discussed, all questions answered.  Educated regarding importance of oral care. Oral care kit at bedside. Call light is within reach, treaded slipper socks on, bed in lowest/ locked position, hourly rounding in place, all needs met at this time.

## 2023-12-06 NOTE — OP REPORT
NAME:  Olga Jeong  MRN:  [id number]  :  1960      DATE OF OPERATION: 2023    PREOPERATIVE DIAGNOSIS:  Large paraesophageal hiatal hernia    POSTOPERATIVE DIAGNOSIS: Large paraesophageal hiatal hernia     OPERATION PERFORMED:   1. 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   2. Hiatal hernia repair with xenograft placement.   3. Laparoscopic Nissen fundoplication.    SURGEON: Jonathan Middleton MD    ASSISTANT:  Flakita Barnes PA-C, PA-C    ANESTHESIOLOGIST:  Anesthesiologist: Clay Benavides M.D.; León Estrada D.O.    ANESTHESIA: General endotracheal anesthesia.     SPECIMEN: none    ESTIMATED BLOOD LOSS: <10cc.     INDICATIONS: The patient is a 63 y.o. female with a diagnosis of large hiatal hernia with a paraesophageal intrathoracic stomach and cardiopulmonary compressive symptoms. She comes today for surgical repair.  She is taken to the operating room today for Laparoscopic Nissen Fundoplication.    DETAILS OF PROCEDURE: After an extensive informed consent discussion process, the patient was brought to the operating room. She was placed in the supine position on the operating table. After induction of general anesthesia and placement of an endotracheal tube, the abdomen was prepped and draped in the usual sterile fashion. After administration of intravenous antibiotics, a bladeless optical entry trocar was carefully inserted into the abdomen. Pneumoperitoneum was established in the usual fashion. A bladeless 5 mm separator trocar was introduced. The laparoscope was introduced. Three additional separator trocars were placed in the upper abdomen and a 5 mm epigastric Nisreen-type liver retractor was placed to elevate the left lateral segment of the liver,   it was secured to the patient's right side with a robot arm.     Careful examination demonstrated a near complete intrathoracic stomach and   omentum and a large 8cm hiatal hernia. The table was placed in  reverse Trendelenburg position and gradually the stomach and omentum were reduced. The attachments were slowly divided with the LigaSure device and the hernia sac was gradually freed allowing us to fully reduce the stomach. There was no esophageal shortening. The left and   right crura were well exposed circumferentially. We began with posterior crural approximating sutures using 0 Ethibond endo-stitches. A series of anterior crural approximating sutures were also placed in a similar fashion. A 42-Zambian blunt tipped bougie was passed down well into the stomach.     A 10cm [employer] resorbable graft was soaked in saline solution and then a V-cut, so that it would reside nicely around the gastroesophageal junction. It was laparoscopically placed and maneuvered into position, so as to help reinforce the crural closure. The graft was sutured to the crural closure with Polysorb Endo stitches with the rough regenerative side facing toward the muscle closure and the smooth side facing toward the gastric serosa. The greater curvature attachments and all the short gastric vessels were all   divided with the LigaSure device. We had nice mobility of the greater curve of the stomach, which was well vascularized and easily passed around posterior to the gastroesophageal junction region. At this time, the fundoplication was now created. A very loose floppy Nissen fundoplication was created over the bougie and loose seromuscular sutures were used to approximate it anteriorly as well as some anchoring sutures from the wrap to the graft and crura.     After final inspections demonstrated a nice result with excellent hemostasis and floppy comfortable wrap, the bougie was removed. The liver retractor was then removed. The pneumoperitoneum was allowed to escape. The ports were removed under direct vision. The port sites were irrigated and closed with Vicryl sutures. Steri-Strips and sterile dressings were applied.    The patient  tolerated the procedure well and there were no apparent complications. All sponge, needle, and instrument counts were correct on 2 separate occasions. She was awakened, extubated, and transferred to the recovery room in satisfactory condition.       ____________________________________   Jonathan Middleton MD  DD: 12/6/2023  1:15 PM    CC:  [facility];

## 2023-12-06 NOTE — ANESTHESIA PROCEDURE NOTES
Airway    Date/Time: 12/6/2023 9:47 AM    Performed by: León Estrada D.O.  Authorized by: León Estrada D.O.    Location:  OR  Urgency:  Elective  Difficult Airway: No    Indications for Airway Management:  Anesthesia      Spontaneous Ventilation: absent    Sedation Level:  Deep  Preoxygenated: Yes    Patient Position:  Sniffing  MILS Maintained Throughout: No    Mask Difficulty Assessment:  0 - not attempted  Final Airway Type:  Endotracheal airway  Final Endotracheal Airway:  ETT  Cuffed: Yes    Technique Used for Successful ETT Placement:  Direct laryngoscopy    Insertion Site:  Oral  Blade Type:  Osmel  Laryngoscope Blade/Videolaryngoscope Blade Size:  4  ETT Size (mm):  7.5  Measured from:  Teeth  ETT to Teeth (cm):  22  Placement Verified by: auscultation and capnometry    Cormack-Lehane Classification:  Grade IIb - view of arytenoids or posterior of glottis only  Number of Attempts at Approach:  1  Ventilation Between Attempts:  None  Number of Other Approaches Attempted:  0

## 2023-12-06 NOTE — PROGRESS NOTES
4 Eyes Skin Assessment Completed by Mariella WYNN RN and MICKEY Hernandez.    Head WDL  Ears WDL  Nose WDL  Mouth WDL  Neck WDL  Breast/Chest WDL  Shoulder Blades WDL  Spine WDL  (R) Arm/Elbow/Hand WDL  (L) Arm/Elbow/Hand WDL  Abdomen 5xLap site, steri strip Band-Aid CDI  Groin WDL  Scrotum/Coccyx/Buttocks WDL  (R) Leg WDL  (L) Leg WDL  (R) Heel/Foot/Toe WDL  (L) Heel/Foot/Toe WDL          Devices In Places Blood Pressure Cuff, Pulse Ox, and SCD's      Interventions In Place NC W/Ear Foams, Pillows, and Pressure Redistribution Mattress    Possible Skin Injury No    Pictures Uploaded Into Epic N/A  Wound Consult Placed N/A  RN Wound Prevention Protocol Ordered No

## 2023-12-06 NOTE — ANESTHESIA POSTPROCEDURE EVALUATION
Patient: Olga Jeong    Procedure Summary       Date: 12/06/23 Room / Location: [place] [address] / SURGERY [facility]    Anesthesia Start: 0939 Anesthesia Stop: 1137    Procedure: LAPAROSCOPIC NISSEN FUNDOPLICATION (Abdomen) Diagnosis: (HIATAL HERNIA)    Surgeons: Jonathan Middleton M.D. Responsible Provider: Clay Benavides M.D.    Anesthesia Type: general ASA Status: 3            Final Anesthesia Type: general  Last vitals  BP   Blood Pressure: (!) 112/92    Temp   36.2 °C (97.1 °F)    Pulse   72   Resp   12    SpO2   100 %      Anesthesia Post Evaluation    Patient location during evaluation: PACU  Patient participation: complete - patient participated  Level of consciousness: awake and alert    Airway patency: patent  Anesthetic complications: no  Cardiovascular status: hemodynamically stable  Respiratory status: acceptable  Hydration status: euvolemic    PONV: none          There were no known notable events for this encounter.     Nurse Pain Score: 0 (NPRS)

## 2023-12-06 NOTE — ANESTHESIA TIME REPORT
Anesthesia Start and Stop Event Times       Date Time Event    12/6/2023 0924 Ready for Procedure     0939 Anesthesia Start     1137 Anesthesia Stop          Responsible Staff  12/06/23      Name Role Begin End    León Estrada D.O. Anesth 0939 1045    Clay Benavides M.D. Anesth 1048 1138          Overtime Reason:  no overtime (within assigned shift)    Comments:

## 2023-12-06 NOTE — ANESTHESIA PREPROCEDURE EVALUATION
Case: [id number] Date/Time: 12/06/23 0945    Procedure: LAPAROSCOPIC NISSEN FUNDOPLICATION    Pre-op diagnosis: HIATAL HERNIA    Location: [facility] 10 / SURGERY [facility]    Surgeons: Jonathan Middleton M.D.          Anes H&P:  PAST MEDICAL HISTORY:   63 y.o. female who presents for Procedure(s):  LAPAROSCOPIC NISSEN FUNDOPLICATION.  She has current and past medical problems significant for:    - HTN  - GERD  - TIA x3 (last was over 10 years ago)    Past Medical History:   Diagnosis Date    Arthritis 2022    Breath shortness 20222    Dental disorder 2020    Heart burn 2022    Hiatus hernia syndrome 8/23    High cholesterol 10+ years    HTN (hypertension)     Indigestion        SMOKING/ALCOHOL/RECREATIONAL DRUG USE:  Social History     Tobacco Use    Smoking status: Former     Types: Cigarettes    Smokeless tobacco: Never    Tobacco comments:     Quit smoking 35-40 years ago. Only smoke a few cigarettes a day.   Vaping Use    Vaping Use: Never used   Substance Use Topics    Alcohol use: Not Currently    Drug use: Never     Social History     Substance and Sexual Activity   Drug Use Never       PAST SURGICAL HISTORY:  Past Surgical History:   Procedure Laterality Date    [place] UPPER GI ENDOSCOPY,DIAGNOSIS N/A 08/09/2023    Procedure: GASTROSCOPY;  Surgeon: Jonathan Middleton M.D.;  Location: SURGERY [facility];  Service: General    ERCP IN OR N/A 10/10/2015    Procedure: ERCP IN OR;  Surgeon: Jefry Bennett M.D.;  Location: [facility];  Service:     CHOLECYSTECTOMY         ALLERGIES:   Allergies   Allergen Reactions    Amlodipine Swelling     Swelling of the legs.       MEDICATIONS:  No current facility-administered medications on file prior to encounter.     Current Outpatient Medications on File Prior to Encounter   Medication Sig Dispense Refill    atorvastatin (LIPITOR) 80 MG tablet Take 80 mg by mouth every day.      chlorthalidone (HYGROTON) 25 MG Tab Take 25 mg by mouth every day.      telmisartan  (MICARDIS) 80 MG Tab Take 80 mg by mouth every day.      Biotin 86991 MCG Tab Take 10,000 mcg by mouth every day.      TURMERIC PO Take 1 Capsule by mouth every day.      naproxen (ALEVE) 220 MG tablet Take 220 mg by mouth 2 times a day as needed. Indications: Pain      aspirin 81 MG EC tablet Take 81 mg by mouth every day.         LABS:  Lab Results   Component Value Date/Time    HEMOGLOBIN 13.0 12/05/2023 0921    HEMATOCRIT 38.2 12/05/2023 0921    WBC 3.8 (L) 12/05/2023 0921     Lab Results   Component Value Date/Time    SODIUM 142 12/05/2023 0921    POTASSIUM 3.6 12/05/2023 0921    CHLORIDE 106 12/05/2023 0921    CO2 25 12/05/2023 0921    GLUCOSE 105 (H) 12/05/2023 0921    BUN 13 12/05/2023 0921    CALCIUM 8.9 12/05/2023 0921         PREVIOUS ANESTHETICS: See EMR  __________________________________________    Relevant Problems      (positive) Hepatitis       Physical Exam    Airway   Mallampati: II  TM distance: >3 FB  Neck ROM: full       Cardiovascular - normal exam  Rhythm: regular  Rate: normal  (-) murmur     Dental - normal exam           Pulmonary - normal exam  Breath sounds clear to auscultation     Abdominal   (+) obese     Neurological - normal exam                   Anesthesia Plan    ASA 3   ASA physical status 3 criteria: morbid obesity - BMI greater than or equal to 40    Plan - general       Airway plan will be ETT          Induction: intravenous    Postoperative Plan: Postoperative administration of opioids is intended.    Pertinent diagnostic labs and testing reviewed    Informed Consent:    Anesthetic plan and risks discussed with patient.    Use of blood products discussed with: patient whom consented to blood products.

## 2023-12-07 ENCOUNTER — PHARMACY VISIT (OUTPATIENT)
Dept: PHARMACY | Facility: MEDICAL CENTER | Age: 63
End: 2023-12-07
Payer: MEDICARE

## 2023-12-07 LAB
ALBUMIN SERPL BCP-MCNC: 4 G/DL (ref 3.2–4.9)
ALBUMIN/GLOB SERPL: 1.3 G/DL
ALP SERPL-CCNC: 81 U/L (ref 30–99)
ALT SERPL-CCNC: 39 U/L (ref 2–50)
ANION GAP SERPL CALC-SCNC: 9 MMOL/L (ref 7–16)
AST SERPL-CCNC: 37 U/L (ref 12–45)
BILIRUB SERPL-MCNC: 0.4 MG/DL (ref 0.1–1.5)
BUN SERPL-MCNC: 13 MG/DL (ref 8–22)
CALCIUM ALBUM COR SERPL-MCNC: 8.5 MG/DL (ref 8.5–10.5)
CALCIUM SERPL-MCNC: 8.5 MG/DL (ref 8.5–10.5)
CHLORIDE SERPL-SCNC: 102 MMOL/L (ref 96–112)
CO2 SERPL-SCNC: 26 MMOL/L (ref 20–33)
CREAT SERPL-MCNC: 0.81 MG/DL (ref 0.5–1.4)
ERYTHROCYTE [DISTWIDTH] IN BLOOD BY AUTOMATED COUNT: 45.2 FL (ref 35.9–50)
GFR SERPLBLD CREATININE-BSD FMLA CKD-EPI: 82 ML/MIN/1.73 M 2
GLOBULIN SER CALC-MCNC: 3 G/DL (ref 1.9–3.5)
GLUCOSE SERPL-MCNC: 131 MG/DL (ref 65–99)
HCT VFR BLD AUTO: 38 % (ref 37–47)
HGB BLD-MCNC: 12.6 G/DL (ref 12–16)
MCH RBC QN AUTO: 29.6 PG (ref 27–33)
MCHC RBC AUTO-ENTMCNC: 33.2 G/DL (ref 32.2–35.5)
MCV RBC AUTO: 89.4 FL (ref 81.4–97.8)
PLATELET # BLD AUTO: 351 K/UL (ref 164–446)
PMV BLD AUTO: 10.5 FL (ref 9–12.9)
POTASSIUM SERPL-SCNC: 4.1 MMOL/L (ref 3.6–5.5)
PROT SERPL-MCNC: 7 G/DL (ref 6–8.2)
RBC # BLD AUTO: 4.25 M/UL (ref 4.2–5.4)
SODIUM SERPL-SCNC: 137 MMOL/L (ref 135–145)
WBC # BLD AUTO: 9.4 K/UL (ref 4.8–10.8)

## 2023-12-07 PROCEDURE — 85027 COMPLETE CBC AUTOMATED: CPT

## 2023-12-07 PROCEDURE — G0378 HOSPITAL OBSERVATION PER HR: HCPCS

## 2023-12-07 PROCEDURE — 700111 HCHG RX REV CODE 636 W/ 250 OVERRIDE (IP): Mod: JZ | Performed by: STUDENT IN AN ORGANIZED HEALTH CARE EDUCATION/TRAINING PROGRAM

## 2023-12-07 PROCEDURE — RXMED WILLOW AMBULATORY MEDICATION CHARGE: Performed by: PHYSICIAN ASSISTANT

## 2023-12-07 PROCEDURE — 700102 HCHG RX REV CODE 250 W/ 637 OVERRIDE(OP): Performed by: STUDENT IN AN ORGANIZED HEALTH CARE EDUCATION/TRAINING PROGRAM

## 2023-12-07 PROCEDURE — 80053 COMPREHEN METABOLIC PANEL: CPT

## 2023-12-07 PROCEDURE — A9270 NON-COVERED ITEM OR SERVICE: HCPCS | Performed by: STUDENT IN AN ORGANIZED HEALTH CARE EDUCATION/TRAINING PROGRAM

## 2023-12-07 PROCEDURE — 94760 N-INVAS EAR/PLS OXIMETRY 1: CPT

## 2023-12-07 PROCEDURE — 700105 HCHG RX REV CODE 258: Performed by: STUDENT IN AN ORGANIZED HEALTH CARE EDUCATION/TRAINING PROGRAM

## 2023-12-07 RX ORDER — ONDANSETRON 4 MG/1
4 TABLET, FILM COATED ORAL EVERY 6 HOURS PRN
Qty: 20 TABLET | Refills: 0 | Status: SHIPPED | OUTPATIENT
Start: 2023-12-07 | End: 2023-12-12

## 2023-12-07 RX ADMIN — ACETAMINOPHEN 1000 MG: 500 TABLET, FILM COATED ORAL at 05:34

## 2023-12-07 RX ADMIN — ENOXAPARIN SODIUM 40 MG: 100 INJECTION SUBCUTANEOUS at 21:21

## 2023-12-07 RX ADMIN — OXYCODONE HYDROCHLORIDE 10 MG: 5 SOLUTION ORAL at 15:02

## 2023-12-07 RX ADMIN — ENOXAPARIN SODIUM 40 MG: 100 INJECTION SUBCUTANEOUS at 11:15

## 2023-12-07 RX ADMIN — OXYCODONE HYDROCHLORIDE 10 MG: 5 SOLUTION ORAL at 21:21

## 2023-12-07 RX ADMIN — FAMOTIDINE 20 MG: 10 INJECTION, SOLUTION INTRAVENOUS at 18:47

## 2023-12-07 RX ADMIN — ACETAMINOPHEN 1000 MG: 10 INJECTION, SOLUTION INTRAVENOUS at 00:16

## 2023-12-07 RX ADMIN — FAMOTIDINE 20 MG: 10 INJECTION, SOLUTION INTRAVENOUS at 05:37

## 2023-12-07 RX ADMIN — OXYCODONE HYDROCHLORIDE 10 MG: 5 SOLUTION ORAL at 05:34

## 2023-12-07 RX ADMIN — ONDANSETRON 4 MG: 2 INJECTION INTRAMUSCULAR; INTRAVENOUS at 11:17

## 2023-12-07 RX ADMIN — POTASSIUM CHLORIDE: 149 INJECTION, SOLUTION, CONCENTRATE INTRAVENOUS at 11:21

## 2023-12-07 RX ADMIN — ONDANSETRON 4 MG: 2 INJECTION INTRAMUSCULAR; INTRAVENOUS at 18:47

## 2023-12-07 RX ADMIN — OXYCODONE HYDROCHLORIDE 10 MG: 5 SOLUTION ORAL at 01:15

## 2023-12-07 RX ADMIN — ACETAMINOPHEN 1000 MG: 500 TABLET, FILM COATED ORAL at 18:47

## 2023-12-07 ASSESSMENT — ENCOUNTER SYMPTOMS
FEVER: 0
SHORTNESS OF BREATH: 0
VOMITING: 0
CHILLS: 0
NAUSEA: 0
PALPITATIONS: 0
DIARRHEA: 0
COUGH: 0
HEARTBURN: 0
ABDOMINAL PAIN: 1

## 2023-12-07 ASSESSMENT — PAIN DESCRIPTION - PAIN TYPE
TYPE: ACUTE PAIN;SURGICAL PAIN
TYPE: ACUTE PAIN
TYPE: ACUTE PAIN;SURGICAL PAIN
TYPE: ACUTE PAIN
TYPE: ACUTE PAIN

## 2023-12-07 NOTE — PROGRESS NOTES
Assumed care of patient at 0645. Bedside report received.      Assessment complete.   AA&Ox4. Denies CP/SOB. 2L needed at this time. IS teaching reinforced, needs more coaching. 800 on IS      Reporting pain controlled at this time. Encouraged to walk, patient stated she would later in the morning. Educated patient regarding pharmacologic and non pharmacologic modalities for pain management.     Skin per flow sheets. 5 lab sites to abdomen steri strips and Band-Aid, CDI, all other skin intact     Tolerating diet. Denies N/V.  + void  - BM.      Pt ambulates hand held assist. Fall prevention measures in place per flowsheets.     SCD's in use/Educated on SCD use, Pharmacologic VTE prophylaxis in use.     Plan of care discussed, discussed ambulation plan and encouraged IS use.   All questions answered.  Educated regarding importance of oral care. Oral care kit at bedside. Call light is within reach, treaded slipper socks on, bed in lowest/ locked position, hourly rounding in place, all needs met at this time

## 2023-12-07 NOTE — CARE PLAN
The patient is Stable - Low risk of patient condition declining or worsening    Shift Goals  Clinical Goals: pain control, rest  Patient Goals: sleep, psin control    Progress made toward(s) clinical / shift goals:  pain pain controlled during the nigh with PRN pain medication. Pt slept for much of the night    Patient is not progressing towards the following goals:

## 2023-12-07 NOTE — DISCHARGE INSTRUCTIONS
Post-Nissen Discharge Instructions:    Please see Dr. Middleton for office follow-up 2 weeks after surgery. It is crucial that you come in for this visit.     Call [phone] for an appointment.     Diet:     Day 1-2: Clear Liquids    These are liquids that are transparent. Examples are broth, water, clear juices like apple and grape, low-calorie, non-carbonated dfrinks such as Crystal Light, warm or cold decaf tea, sugar free popsicles or jello, and clear protein drinks such as Isopure (found at [facility] and other retail stores). Sip small amounts throughout the day, aim for 48 oz. Do not attempt to swallow more than a teaspoon at a time. Avoid carbonated beverages and sodas.    Day 3-7: Full Liquids    Full liquids can pour off a spoon and contain no lumps or food pieces. Examples are all the drinks from the clear liquid list, meal replacement of protein shakes (iMetabolic Perfect Meal), milk, tomato juice, mashed potatoes, thinned with milk or broth, cream soups withouht lumps, yogurt, thinned to pour off a spoon, warm cereal such as cream of wheat, thinned to pour off a spoon. Drink water as well as full liquids for optimial hydration, sip small amounts throughout the day. Do not attempt to swallow more than a teaspoon at a time.    Next 2-3 weeks: Soft Foods    Soft foods are just that - soft. They should be easy to chew and easy to mash with a fork. You may need to grind, blend, peel or finely chop foods to achieve a soft texture. Chew well. Examples are cottage cheese, soft cheese, refried and other soft cooked beans, white flaky fish, eggs and egg substitutes, oatmeal and cream of wheat,m ground lean meats, potato without skin, soft cooked vegetables, unsweetened canned fruit, soft fresh fruit such as bananas, and whole grain crackers. Start with very soft foods and progress slowly, chewing your food very well. Eat 5-6 small meals a day, no more than one cup of food total each meal.    Week 4 and beyond:    Now you  should be resuming a regular diet. Remember to chew your food well and don't introduce more than one or two new foods into your diet per day. You may have difficulty or inability to belch after surgery. This is a normal consequence of the procedure. To decrease symptoms, avoid carbonated beverages and sodas.    Activity:   In general, you may resume normal activity including sports and sex as soon as you are up to it. A few activities that suddenly increase pressure in the abdominal cavity (e.g., popping wheelies on bikes, abdominal crunches) should be avoided for 6 weeks after surgery. You should restrict heavy lifting for 6 weeks (over 15 lbs.). A bloated sensation is common and loose clothes are needed for a few days or week.       Chest and Shoulder Pains:   Sometimes patients will experience shoulder pain, or deep pain in the chest after surgery. This is due in part to the gas used at laparoscopy, but more so to the sutures placed in the diaphragm muscle; and should gradually resolve.     If Food Sticks:   It is not uncommon for patients to experience food sticking -sometimes the only thing you feel is severe pain on swallowing - for a while after surgery. When this happens the best things to do are to stand up, to walkaround slowly, and to try sipping some lukewarm water. Generally these pains will pass within 10-15 minutes; if they persist longer you should call Dr. Middleton's office.    Medications:   You have been given a prescription for a pain medication. If you don’t need as much pain medicine, you can take two extra strength Tylenol every 6 hours. Drink plenty of liquids. If you don’t have a bowel movement for two or three days take Metamucil (one tablespoon in 16 oz. water or juice twice a day), or Mineral Oil (2 tablespoons by mouth twice a day), or Milk of Magnesia (1-2 tablespoons once a day). You may resume other medications you were on prior to surgery. Continue any heartburn medication: Prilosec,  Prevacid, Axid, Pepcid, Tagamet, Zantac.       Incisions:   Remove the gauze covered with tape 48 hours after surgery. Leave on the small strips of tape (steri-strips). They will fall of in 5-7 days. You may shower. Some swelling and a lump under the incision will develop and is part of the natural healing process; you needn't be alarmed unless there is drainage more than a Band-Aid will handle. Bruising may occur here too. Do not soak in a bathtub or swimming pool for 2 weeks. After 48 hours it is not necessary to keep your incision covered unless it makes you more comfortable.     Call for:   Call if you have (1) Fevers to more than 101.50 F, (2) Unusual chest or leg pain, (3) Drainage or fluid from incision that may be foul smelling, increased tenderness or soreness at the wound or the wound edges are no longer together, redness or swelling at the incision site. Please do not hesitate to call with any other questions.     Office address:  [facility]    [address]  [place] [address]    Jonathan Middleton M.D.  [facility]  [phone]

## 2023-12-07 NOTE — PROGRESS NOTES
Surgical Progress Note    Author: Luba Ivory P.A.-C. 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  She is doing ok this morning, lying in bed seeming somewhat fatigued. She c/o epigastric, chest discomfort related to the surgery. Tolerating clears without nausea/vomiting. Mild dysphagia with sips, but improved since prior to the surgery. No regurgitation or reflux. She reports that her abdominal incisions are not causing discomfort, controlled with medication. Pt is ambulating to the bathroom only. She is voiding. She remains on O2 at 2LPM, unable to wean thus far. RN reporting that she will continue encouragement of IS and ambulation.    Review of Systems   Constitutional:  Negative for chills and fever.   Respiratory:  Negative for cough and shortness of breath.    Cardiovascular:  Negative for chest pain and palpitations.   Gastrointestinal:  Positive for abdominal pain (epigastric/chest and mild incisional). Negative for diarrhea, heartburn, nausea and vomiting.   Genitourinary:  Negative for dysuria.     Hemodynamics:  Temp (24hrs), Av.3 °C (97.4 °F), Min:36.1 °C (96.9 °F), Max:36.6 °C (97.9 °F)  Temperature: 36.5 °C (97.7 °F)  Pulse  Av.9  Min: 54  Max: 74   Blood Pressure: (!) 149/77     Respiratory:    Respiration: 18, Pulse Oximetry: 91 %           Neuro:  GCS       Fluids:    Intake/Output Summary (Last 24 hours) at 2023 0818  Last data filed at 2023 1730  Gross per 24 hour   Intake 120 ml   Output --   Net 120 ml        Current Diet Order   Procedures    Diet Order Diet: Clear Liquid; Miscellaneous modifications: (optional): Bariatric     Physical Exam  Constitutional:       Appearance: She is obese.   HENT:      Head: Normocephalic and atraumatic.      Mouth/Throat:      Pharynx: Oropharynx is clear.    Eyes:      Conjunctiva/sclera: Conjunctivae normal.   Cardiovascular:      Rate and Rhythm: Normal rate and regular rhythm.   Pulmonary:      Effort: Pulmonary effort is normal.   Abdominal:      General: There is distension (mild).      Palpations: Abdomen is soft. There is no mass.      Tenderness: There is abdominal tenderness (incisional). There is no guarding or rebound.   Musculoskeletal:         General: Normal range of motion.      Cervical back: Normal range of motion.   Skin:     General: Skin is warm and dry.      Findings: No erythema or rash.      Comments: Incisions with minimal serosanguinous ooze   Neurological:      Mental Status: She is alert and oriented to person, place, and time.   Psychiatric:         Mood and Affect: Mood normal.       Labs:  Recent Results (from the past 24 hour(s))   CBC without Differential (blood)    Collection Time: 12/07/23  1:36 AM   Result Value Ref Range    WBC 9.4 4.8 - 10.8 K/uL    RBC 4.25 4.20 - 5.40 M/uL    Hemoglobin 12.6 12.0 - 16.0 g/dL    Hematocrit 38.0 37.0 - 47.0 %    MCV 89.4 81.4 - 97.8 fL    MCH 29.6 27.0 - 33.0 pg    MCHC 33.2 32.2 - 35.5 g/dL    RDW 45.2 35.9 - 50.0 fL    Platelet Count 351 164 - 446 K/uL    MPV 10.5 9.0 - 12.9 fL   Comp Metabolic Panel (CMP)    Collection Time: 12/07/23  1:36 AM   Result Value Ref Range    Sodium 137 135 - 145 mmol/L    Potassium 4.1 3.6 - 5.5 mmol/L    Chloride 102 96 - 112 mmol/L    Co2 26 20 - 33 mmol/L    Anion Gap 9.0 7.0 - 16.0    Glucose 131 (H) 65 - 99 mg/dL    Bun 13 8 - 22 mg/dL    Creatinine 0.81 0.50 - 1.40 mg/dL    Calcium 8.5 8.5 - 10.5 mg/dL    Correct Calcium 8.5 8.5 - 10.5 mg/dL    AST(SGOT) 37 12 - 45 U/L    ALT(SGPT) 39 2 - 50 U/L    Alkaline Phosphatase 81 30 - 99 U/L    Total Bilirubin 0.4 0.1 - 1.5 mg/dL    Albumin 4.0 3.2 - 4.9 g/dL    Total Protein 7.0 6.0 - 8.2 g/dL    Globulin 3.0 1.9 - 3.5 g/dL    A-G Ratio 1.3 g/dL   ESTIMATED GFR    Collection Time: 12/07/23  1:36 AM   Result Value Ref  Range    GFR (CKD-EPI) 82 >60 mL/min/1.73 m 2     Medical Decision Making, by Problem:  Active Hospital Problems    Diagnosis     Status post Nissen fundoplication (without gastrostomy tube) procedure [Z98.890]      Plan:  Pt is alert and oriented, NAD. Breathing unlabored. Tolerating PO. Abdomen benign.  Incisions ok. VS stable. Labs reviewed.  CBC ok.  Hb ok. CMP ok. Encouraged ambulation and incentive spirometry.  Wean O2. Pt may need to stay another night for nausea, pain control, hypoxia, will see how the day progresses. Otherwise okay for discharge later this afternoon. Pt also seen and examined by Dr. Middleton.    Quality Measures:  Quality-Core Measures   Reviewed items::  Labs reviewed  Cline catheter::  No Cline  DVT prophylaxis pharmacological::  Enoxaparin (Lovenox)  DVT prophylaxis - mechanical:  SCDs  Ulcer Prophylaxis::  Yes      Discussed patient condition with RN, Patient, and Dr. Middleton

## 2023-12-07 NOTE — DIETARY
NUTRITION SERVICES: BMI - Pt with BMI >40 (=Body mass index is 40.7 kg/m².), Class III obesity. Pt is s/p Laparoscopic Nissen fundoplication. Weight loss counseling not appropriate in acute care setting. RECOMMEND - If appropriate at DC please refer to outpatient nutrition services for weight management.

## 2023-12-08 VITALS
DIASTOLIC BLOOD PRESSURE: 71 MMHG | WEIGHT: 275.57 LBS | HEART RATE: 93 BPM | TEMPERATURE: 97.5 F | OXYGEN SATURATION: 85 % | HEIGHT: 69 IN | SYSTOLIC BLOOD PRESSURE: 128 MMHG | RESPIRATION RATE: 17 BRPM | BODY MASS INDEX: 40.82 KG/M2

## 2023-12-08 PROBLEM — R09.02 HYPOXIA: Status: ACTIVE | Noted: 2023-12-08

## 2023-12-08 LAB
ALBUMIN SERPL BCP-MCNC: 3.4 G/DL (ref 3.2–4.9)
ALBUMIN/GLOB SERPL: 1.3 G/DL
ALP SERPL-CCNC: 72 U/L (ref 30–99)
ALT SERPL-CCNC: 28 U/L (ref 2–50)
ANION GAP SERPL CALC-SCNC: 8 MMOL/L (ref 7–16)
AST SERPL-CCNC: 24 U/L (ref 12–45)
BILIRUB SERPL-MCNC: 0.4 MG/DL (ref 0.1–1.5)
BUN SERPL-MCNC: 11 MG/DL (ref 8–22)
CALCIUM ALBUM COR SERPL-MCNC: 9 MG/DL (ref 8.5–10.5)
CALCIUM SERPL-MCNC: 8.5 MG/DL (ref 8.5–10.5)
CHLORIDE SERPL-SCNC: 99 MMOL/L (ref 96–112)
CO2 SERPL-SCNC: 30 MMOL/L (ref 20–33)
CREAT SERPL-MCNC: 0.73 MG/DL (ref 0.5–1.4)
ERYTHROCYTE [DISTWIDTH] IN BLOOD BY AUTOMATED COUNT: 45.4 FL (ref 35.9–50)
GFR SERPLBLD CREATININE-BSD FMLA CKD-EPI: 93 ML/MIN/1.73 M 2
GLOBULIN SER CALC-MCNC: 2.7 G/DL (ref 1.9–3.5)
GLUCOSE SERPL-MCNC: 119 MG/DL (ref 65–99)
HCT VFR BLD AUTO: 34.1 % (ref 37–47)
HGB BLD-MCNC: 11.4 G/DL (ref 12–16)
MCH RBC QN AUTO: 29.8 PG (ref 27–33)
MCHC RBC AUTO-ENTMCNC: 33.4 G/DL (ref 32.2–35.5)
MCV RBC AUTO: 89.3 FL (ref 81.4–97.8)
PLATELET # BLD AUTO: 295 K/UL (ref 164–446)
PMV BLD AUTO: 10.4 FL (ref 9–12.9)
POTASSIUM SERPL-SCNC: 3.7 MMOL/L (ref 3.6–5.5)
PROT SERPL-MCNC: 6.1 G/DL (ref 6–8.2)
RBC # BLD AUTO: 3.82 M/UL (ref 4.2–5.4)
SODIUM SERPL-SCNC: 137 MMOL/L (ref 135–145)
WBC # BLD AUTO: 5.8 K/UL (ref 4.8–10.8)

## 2023-12-08 PROCEDURE — A9270 NON-COVERED ITEM OR SERVICE: HCPCS | Performed by: STUDENT IN AN ORGANIZED HEALTH CARE EDUCATION/TRAINING PROGRAM

## 2023-12-08 PROCEDURE — 80053 COMPREHEN METABOLIC PANEL: CPT

## 2023-12-08 PROCEDURE — 700111 HCHG RX REV CODE 636 W/ 250 OVERRIDE (IP): Mod: JZ | Performed by: STUDENT IN AN ORGANIZED HEALTH CARE EDUCATION/TRAINING PROGRAM

## 2023-12-08 PROCEDURE — G0378 HOSPITAL OBSERVATION PER HR: HCPCS

## 2023-12-08 PROCEDURE — 700102 HCHG RX REV CODE 250 W/ 637 OVERRIDE(OP): Performed by: STUDENT IN AN ORGANIZED HEALTH CARE EDUCATION/TRAINING PROGRAM

## 2023-12-08 PROCEDURE — 85027 COMPLETE CBC AUTOMATED: CPT

## 2023-12-08 RX ADMIN — ACETAMINOPHEN 1000 MG: 500 TABLET, FILM COATED ORAL at 04:20

## 2023-12-08 RX ADMIN — ACETAMINOPHEN 1000 MG: 500 TABLET, FILM COATED ORAL at 14:20

## 2023-12-08 RX ADMIN — ENOXAPARIN SODIUM 40 MG: 100 INJECTION SUBCUTANEOUS at 09:59

## 2023-12-08 RX ADMIN — FAMOTIDINE 20 MG: 10 INJECTION, SOLUTION INTRAVENOUS at 04:21

## 2023-12-08 ASSESSMENT — ENCOUNTER SYMPTOMS
VOMITING: 0
CHILLS: 0
ABDOMINAL PAIN: 1
COUGH: 0
NAUSEA: 0
FEVER: 0
HEARTBURN: 0
DIARRHEA: 0
SHORTNESS OF BREATH: 0
PALPITATIONS: 0

## 2023-12-08 ASSESSMENT — PAIN DESCRIPTION - PAIN TYPE
TYPE: ACUTE PAIN
TYPE: ACUTE PAIN

## 2023-12-08 NOTE — CARE PLAN
The patient is Stable - Low risk of patient condition declining or worsening    Shift Goals  Clinical Goals: pain control, wean 02 in am, rest  Patient Goals: D/C next day  Family Goals: none present at this time    Progress made toward(s) clinical / shift goals:  pt pain was controlled throughout the night with the use of PRN pain medications, pt maintained minimum of 91 % o2 on 0.5L NC. Pt rested during the night.   Problem: Pain - Standard  Goal: Alleviation of pain or a reduction in pain to the patient’s comfort goal  Outcome: Progressing     Problem: Knowledge Deficit - Standard  Goal: Patient and family/care givers will demonstrate understanding of plan of care, disease process/condition, diagnostic tests and medications  Outcome: Progressing       Patient is not progressing towards the following goals:

## 2023-12-08 NOTE — FACE TO FACE
"Face to Face Note  -  Durable Medical Equipment    Luba Ivory P.A.-C. - NPI: [id number]  I certify that this patient is under my care and that they had a durable medical equipment(DME)face to face encounter by myself that meets the physician DME face-to-face encounter requirements with this patient on:    Date of encounter:   Patient:                    MRN:                       YOB: 2022  Olga Jeong  [id number]  1960     The encounter with the patient was in whole, or in part, for the following medical condition, which is the primary reason for durable medical equipment:  Post-Op Surgery and Other - Postoperative respiratory failure, Hypoxia, Morbid Obesity    I certify that, based on my findings, the following durable medical equipment is medically necessary:    Oxygen   HOME O2 Saturation Measurements:(Values must be present for Home Oxygen orders)  Room air sat at rest: 82  Room air sat with amb: 74  With liters of O2: 2, O2 sat at rest with O2: 91  With Liters of O2: 2 (Simultaneous filing. User may not have seen previous data.), O2 sat with amb with O2 : 91  Is the patient mobile?: Yes  If patient feels more short of breath, they can go up to 6 liters per minute and contact healthcare provider.    Supporting Symptoms: The patient requires supplemental oxygen, as the following interventions have been tried with limited or no improvement: \"Ambulation with oximetry and \"Incentive spirometry.    My Clinical findings support the need for the above equipment due to:  Hypoxia, Other - Postoperative respiratory failure, Morbid Obesity  "

## 2023-12-08 NOTE — PROGRESS NOTES
Report received from RN, assumed care at 1845  Pt is A0X4, and responds appropriately   Pt declines any SOB, chest pain, new onset of numbness/ tingling  Pt rates pain at 7/10, on a scale of 1-10, pt medicated per MAR  Pt is voiding adequatly and without hesitancy  Pt not passing flatus, hypoactive bowel sounds, + BM on PTA  Pt ambulates with a 1x assist with FWW  Pt is tolerating a clear liquid diet, pt denies any nausea/vomiting  Plan of care discussed, all questions answered. Explained importance of calling before getting OOB and pt verbalizes understanding. Explained importance of oral care. Call light is within reach, treaded slipper socks on, bed in lowest/ locked position, hourly rounding in place, all needs met at this time

## 2023-12-08 NOTE — PROGRESS NOTES
Olga Jeong has been provided discharge instructions, to include follow up care, home medications, and activity/diet reviewed. Understanding verbalized. Arm band removed. Medications given from pharmacy. Pt ride present. Pt out of DCL at this time with personal belongings.

## 2023-12-08 NOTE — DISCHARGE PLANNING
Case Management Discharge Planning    Admission Date: 12/6/2023  GMLOS:    ALOS: 0    6-Clicks ADL Score: 22  6-Clicks Mobility Score: 18      Anticipated Discharge Dispo: Discharge Disposition: Discharged to home/self care (01) with close OP follow up    DME Needed: Yes    DME Ordered: Yes    Action(s) Taken: Updated Provider/Nurse on Discharge Plan    Per MICKEY GOODMAN Pt needs home oxygen set up.   Based on Pt's Insurance [facility] and [employer] are the ones contracted. Sent choice form to Perla RICHARDSON.     Per Perla RICHARDSON Pt has been accepted by [facility] and oxygen will be delivered to bedside today.      Escalations Completed: None    Medically Clear: No    Next Steps:   CM to continue to assist Pt with discharge as needed    Barriers to Discharge:   Medical clearance  Pending home oxygen set up    Is the patient up for discharge tomorrow: No

## 2023-12-08 NOTE — DISCHARGE PLANNING
Care Transition Team Assessment      This  RN CM used chart review for this assessment. Pt lives in  a two story Apartment   Pt has  her Mother and Daughter for support. Pt has [employer] for Insurance.   Pt is ambulatory and is independent with both ADLs and IADLs before this hospitalization.       Information Source  Orientation Level: Oriented X4  Information Given By: Other (Comments)  Informant's Name: Chart    Readmission Evaluation  Is this a readmission?: No    Elopement Risk  Legal Hold: No  Ambulatory or Self Mobile in Wheelchair: Yes  Disoriented: No  Psychiatric Symptoms: None  History of Wandering: No  Elopement this Admit: No  Vocalizing Wanting to Leave: No  Displays Behaviors, Body Language Wanting to Leave: No-Not at Risk for Elopement  Elopement Risk: Not at Risk for Elopement    Interdisciplinary Discharge Planning  Lives with - Patient's Self Care Capacity: Parents  Patient or legal guardian wants to designate a caregiver: No  Support Systems: Family Member(s)  Housing / Facility: 2 Story Apartment / Condo  Do You Take your Prescribed Medications Regularly: Yes  Able to Return to Previous ADL's: Yes  Mobility Issues: No  Prior Services: None  Patient Prefers to be Discharged to: Home with help  Assistance Needed: Yes  Durable Medical Equipment: Not Applicable    Discharge Preparedness  What is your plan after discharge?: Home with help  What are your discharge supports?: Parent, Daughter  Prior Functional Level: Ambulatory    Functional Assesment  Prior Functional Level: Ambulatory    Finances  Financial Barriers to Discharge: No  Prescription Coverage: Yes    Vision / Hearing Impairment  Right Eye Vision: Wears Glasses  Left Eye Vision: Wears Glasses  Hearing Impairment : No    Values / Beliefs / Concerns  Values / Beliefs Concerns : No    Advance Directive  Advance Directive?: None    Domestic Abuse  Have you ever been the victim of abuse or violence?: No  Physical Abuse or Sexual Abuse: No  Verbal  Abuse or Emotional Abuse: No  Possible Abuse/Neglect Reported to: Not Applicable    Psychological Assessment  History of Substance Abuse: None  History of Psychiatric Problems: No  Non-compliant with Treatment: No  Newly Diagnosed Illness: Yes         Anticipated Discharge Information  Discharge Disposition: Discharged to home/self care (01)

## 2023-12-08 NOTE — PROGRESS NOTES
Unable to wean O2, APRN notified. Patient will stay another night. Patient aware, motivated to walk and utilize IS

## 2023-12-08 NOTE — PROGRESS NOTES
Surgical Progress Note    Author: Luba Ivory P.A.-C. 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  She is doing ok this morning, sleepy. She stayed an additional night due to hypoxia and inability to wean off oxygen depite IS and ambulation.  Tolerating clears without nausea/vomiting.  No regurgitation or reflux. Abdominal incisional pain controlled with medication. She is voiding. She remains on O2 at 1-2LPM, unable to wean. Home O2 evaluation.    Review of Systems   Constitutional:  Negative for chills and fever.   Respiratory:  Negative for cough and shortness of breath.    Cardiovascular:  Negative for chest pain and palpitations.   Gastrointestinal:  Positive for abdominal pain (epigastric/chest and mild incisional). Negative for diarrhea, heartburn, nausea and vomiting.   Genitourinary:  Negative for dysuria.     Hemodynamics:  Temp (24hrs), Av.6 °C (97.9 °F), Min:36.4 °C (97.5 °F), Max:36.7 °C (98.1 °F)  Temperature: 36.7 °C (98.1 °F)  Pulse  Av.5  Min: 54  Max: 86   Blood Pressure: 112/66     Respiratory:    Respiration: 18, Pulse Oximetry: 94 %           Neuro:  GCS       Fluids:    Intake/Output Summary (Last 24 hours) at 2023 0734  Last data filed at 2023 2121  Gross per 24 hour   Intake 420 ml   Output --   Net 420 ml        Current Diet Order   Procedures    Diet Order Diet: Clear Liquid; Miscellaneous modifications: (optional): Bariatric     Physical Exam  Constitutional:       Appearance: She is obese.   HENT:      Head: Normocephalic and atraumatic.      Mouth/Throat:      Pharynx: Oropharynx is clear.   Eyes:      Conjunctiva/sclera: Conjunctivae normal.   Cardiovascular:      Rate and Rhythm: Normal rate and regular rhythm.   Pulmonary:      Effort: Pulmonary effort is normal.   Abdominal:       General: There is distension (mild).      Palpations: Abdomen is soft. There is no mass.      Tenderness: There is abdominal tenderness (incisional). There is no guarding or rebound.   Musculoskeletal:         General: Normal range of motion.      Cervical back: Normal range of motion.   Skin:     General: Skin is warm and dry.      Findings: No erythema or rash.      Comments: Incisions with minimal serosanguinous ooze   Neurological:      Mental Status: She is alert and oriented to person, place, and time.   Psychiatric:         Mood and Affect: Mood normal.       Labs:  No results found for this or any previous visit (from the past 24 hour(s)).    Medical Decision Making, by Problem:  Active Hospital Problems    Diagnosis     Status post Nissen fundoplication (without gastrostomy tube) procedure [Z98.890]      Plan:  Pt is sleepy, awakened easily, oriented, NAD. Breathing unlabored. Tolerating PO. Abdomen benign. Incisions ok. VS stable. Labs pending.   Encouraged ambulation and incentive spirometry.  Wean O2, if unable, Home O2 evaluation. Pt is cleared for home with close outpatient followup, after home O2 delivery if needed. Follow the postop diet plan. Follow up in our office in 1-2 week. Prescriptions for home sent to [facility] yesterday. Discussed with Dr. Middleton.    Quality Measures:  Quality-Core Measures   Reviewed items::  Labs reviewed  Cline catheter::  No Cline  DVT prophylaxis pharmacological::  Enoxaparin (Lovenox)  DVT prophylaxis - mechanical:  SCDs  Ulcer Prophylaxis::  Yes      Discussed patient condition with RN, Patient, and Dr. Middleton

## 2023-12-08 NOTE — DISCHARGE SUMMARY
Discharge Summary    CHIEF COMPLAINT ON ADMISSION  No chief complaint on file.      Reason for Admission  Status post Nissen fundoplication *     Admission Date  12/6/2023    CODE STATUS  Full Code    HPI & HOSPITAL COURSE  This is a 63 y.o. female who completed the preoperative evaluation for surgery. Postoperatively, the patient well tolerating clear liquids. Incisional pain has been well controlled and improved throughout her hospital stay. The patient was able to slowly tolerate more oral intake and ambulate the curran more during the hospital stay.  Vitals signs and labs were reviewed and monitored daily throughout the hospital stay. We have been unable to wean her off Oxygen despite IS and ambulation. Home O2 has been ordered and will be delivered to the bedside before discharge home. The patient is cleared for discharge home today with Home O2 with close outpatient follow up in our surgical office. She was given prescriptions for pain medication antinausea medication. The patient should follow the postop diet plan and follow up in the office in 1-2 weeks.     No notes on file    Therefore, she is discharged in good and stable condition to home with close outpatient follow-up.    The patient met 2-midnight criteria for an inpatient stay at the time of discharge.    Discharge Date  12/8/2023    FOLLOW UP ITEMS POST DISCHARGE  Follow up in 1-2 weeks. Follow the postop diet plan.    DISCHARGE DIAGNOSES  Principal Problem:    Status post Nissen fundoplication (without gastrostomy tube) procedure (POA: Yes)  Active Problems:    Hypoxia (POA: Unknown)  Resolved Problems:    * No resolved hospital problems. *      FOLLOW UP  No future appointments.  No follow-up provider specified.    MEDICATIONS ON DISCHARGE     Medication List        START taking these medications        Instructions   HYDROcodone-acetaminophen 2.5-108 mg/5mL 7.5-325 MG/15ML solution  Commonly known as: Hycet   Take 15 mL by mouth every four hours as  needed for Moderate Pain or Severe Pain (for pain) for up to 3 days.  Dose: 15 mL     ondansetron 4 MG Tabs tablet  Commonly known as: Zofran   Take 1 Tablet by mouth every 6 hours as needed for Nausea/Vomiting for up to 5 days.  Dose: 4 mg            CONTINUE taking these medications        Instructions   atorvastatin 80 MG tablet  Commonly known as: Lipitor   Take 80 mg by mouth every day.  Dose: 80 mg     Biotin 47909 MCG Tabs   Take 10,000 mcg by mouth every day.  Dose: 10,000 mcg     chlorthalidone 25 MG Tabs  Commonly known as: Hygroton   Take 25 mg by mouth every day.  Dose: 25 mg     telmisartan 80 MG Tabs  Commonly known as: Micardis   Take 80 mg by mouth every day.  Dose: 80 mg     therapeutic multivitamin-minerals Tabs   Take 1 Tablet by mouth every day.  Dose: 1 Tablet     TURMERIC PO   Take 1 Capsule by mouth every day.  Dose: 1 Capsule            STOP taking these medications      aspirin 81 MG EC tablet              Allergies  Allergies   Allergen Reactions    Amlodipine Swelling     Swelling of the legs.       DIET  Orders Placed This Encounter   Procedures    Diet Order Diet: Clear Liquid; Miscellaneous modifications: (optional): Bariatric     Standing Status:   Standing     Number of Occurrences:   1     Order Specific Question:   Diet:     Answer:   Clear Liquid [10]     Order Specific Question:   Miscellaneous modifications: (optional)     Answer:   Bariatric [3]       ACTIVITY  As tolerated.  20-lb lifting restriction    CONSULTATIONS  none    PROCEDURES  1. 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   2. Hiatal hernia repair with xenograft placement.   3. Laparoscopic Nissen fundoplication.    LABORATORY  Lab Results   Component Value Date    SODIUM 137 12/08/2023    POTASSIUM 3.7 12/08/2023    CHLORIDE 99 12/08/2023    CO2 30 12/08/2023    GLUCOSE 119 (H) 12/08/2023    BUN 11 12/08/2023    CREATININE 0.73 12/08/2023        Lab Results   Component Value Date     WBC 5.8 12/08/2023    HEMOGLOBIN 11.4 (L) 12/08/2023    HEMATOCRIT 34.1 (L) 12/08/2023    PLATELETCT 295 12/08/2023        Total time of the discharge process exceeds 30 minutes.

## 2023-12-08 NOTE — DISCHARGE PLANNING
[id number]  Received Choice Form at: 0953 am  Agency/Facility Name: [facility]  Sent Referral per Choice Form at: 0958 am    DPA manually faxed referral to [phone]  DPA placed faxed choice form(s) in Pt media.    [id number]  Agency/Facility Name: [facility]  Outcome: DYLAN received voicemail from Kendall with [facility]. Per Kendall [facility] has received the DME referral and is working on the order. Per Kendall should be able to get the  to deliver this afternoon.  RN CM notified.    [id number]  Agency/Facility Name: [facility]  Spoke To: Amalia  Outcome: Per Amalia drive is ont he way to [facility] currently should be within the hour.  RN CM notified.

## (undated) DEVICE — SET TUBING PNEUMOCLEAR HIGH FLOW SMOKE EVACUATION (10EA/BX)

## (undated) DEVICE — GOWN SURGEONS X-LARGE - DISP. (30/CA)

## (undated) DEVICE — SLEEVE, VASO, THIGH, MED

## (undated) DEVICE — BANDAID SHEER STRIP 3/4 IN (100EA/BX 12BX/CA)

## (undated) DEVICE — GLOVE SZ 7.5 BIOGEL PI MICRO - PF LF (50PR/BX)

## (undated) DEVICE — TROCAR Z THREAD12MM OPTICAL - NON BLADED (6/BX)

## (undated) DEVICE — CLIP APPLIER 10MM ENDO LARGE (3EA/BX)

## (undated) DEVICE — KIT CUSTOM PROCEDURE SINGLE FOR ENDO  (15/CA)

## (undated) DEVICE — SUTURE GENERAL

## (undated) DEVICE — TROCAR 5X100 NON BLADED Z-TH - READ KII (6/BX)

## (undated) DEVICE — ENDOSTITCH LOAD UNIT 0 SURGI - 12/CA

## (undated) DEVICE — BLOCK BITE ENDOSCOPIC 2809 - (100/BX) INTERMEDIATE

## (undated) DEVICE — GOWN WARMING STANDARD FLEX - (30/CA)

## (undated) DEVICE — SEALER ARTICULATING TISSUE NSEAL (6/BX)

## (undated) DEVICE — CHLORAPREP 26 ML APPLICATOR - ORANGE TINT(25/CA)

## (undated) DEVICE — CANNULA W/SEAL 5X100 Z-THRE - ADED KII (12/BX)

## (undated) DEVICE — BOVIE BLADE COATED - (50/PK)

## (undated) DEVICE — PACK GASTRIC BANDING OR - (1/CA)

## (undated) DEVICE — GLOVE SZ 7 BIOGEL PI MICRO - PF LF (50PR/BX 4BX/CA)

## (undated) DEVICE — GLOVE BIOGEL PI INDICATOR SZ 6.5 SURGICAL PF LF - (50/BX 4BX/CA)

## (undated) DEVICE — SUCTION INSTRUMENT YANKAUER BULBOUS TIP W/O VENT (50EA/CA)

## (undated) DEVICE — ELECTRODE DUAL RETURN W/ CORD - (50/PK)

## (undated) DEVICE — GLOVE BIOGEL PI INDICATOR SZ 8.0 SURGICAL PF LF -(50/BX 4BX/CA)

## (undated) DEVICE — GLOVE BIOGEL PI INDICATOR SZ 7.0 SURGICAL PF LF - (50/BX 4BX/CA)

## (undated) DEVICE — SENSOR OXIMETER ADULT SPO2 RD SET (20EA/BX)

## (undated) DEVICE — TOWEL STOP TIMEOUT SAFETY FLAG (40EA/CA)

## (undated) DEVICE — SODIUM CHL IRRIGATION 0.9% 1000ML (12EA/CA)

## (undated) DEVICE — CANISTER SUCTION 3000ML MECHANICAL FILTER AUTO SHUTOFF MEDI-VAC NONSTERILE LF DISP  (40EA/CA)

## (undated) DEVICE — ENDOSTITCH LOAD UNIT 2-0 POLY (12EA/CA)

## (undated) DEVICE — GLOVE BIOGEL PI INDICATOR SZ 7.5 SURGICAL PF LF -(50/BX 4BX/CA)

## (undated) DEVICE — SUTURE 4-0 VICRYL PLUSFS-1 - 27 INCH (36/BX)

## (undated) DEVICE — ENDOSTITCH10MM SUTURING DEVIC - (3/CA)

## (undated) DEVICE — SET LEADWIRE 5 LEAD BEDSIDE DISPOSABLE ECG (1SET OF 5/EA)

## (undated) DEVICE — ENDO SHEARS LONG - (6EA/CA)

## (undated) DEVICE — SET EXTENSION WITH 2 PORTS (48EA/CA) ***PART #2C8610 IS A SUBSTITUTE*****

## (undated) DEVICE — TUBING CLEARLINK DUO-VENT - C-FLO (48EA/CA)

## (undated) DEVICE — ELECTRODE 5MM LHK LAPSCP STERILE DISP- MEGADYNE  (5/CA)

## (undated) DEVICE — LACTATED RINGERS INJ 1000 ML - (14EA/CA 60CA/PF)

## (undated) DEVICE — COVER LIGHT HANDLE ALC PLUS DISP (18EA/BX)

## (undated) DEVICE — GLOVE SZ 6.5 BIOGEL PI MICRO - PF LF (50PR/BX)